# Patient Record
Sex: FEMALE | Race: WHITE | NOT HISPANIC OR LATINO | Employment: OTHER | URBAN - NONMETROPOLITAN AREA
[De-identification: names, ages, dates, MRNs, and addresses within clinical notes are randomized per-mention and may not be internally consistent; named-entity substitution may affect disease eponyms.]

---

## 2024-09-06 ENCOUNTER — MEDICAL CORRESPONDENCE (OUTPATIENT)
Dept: PET IMAGING | Facility: HOSPITAL | Age: 79
End: 2024-09-06

## 2024-09-25 ENCOUNTER — HOSPITAL ENCOUNTER (OUTPATIENT)
Dept: PET IMAGING | Facility: HOSPITAL | Age: 79
Discharge: HOME | End: 2024-09-25
Attending: INTERNAL MEDICINE | Admitting: INTERNAL MEDICINE
Payer: MEDICARE

## 2024-09-25 DIAGNOSIS — Z87.891 PERSONAL HISTORY OF SMOKING: ICD-10-CM

## 2024-09-25 DIAGNOSIS — R91.8 PULMONARY NODULES: ICD-10-CM

## 2024-09-25 PROCEDURE — 78815 PET IMAGE W/CT SKULL-THIGH: CPT | Mod: PI

## 2024-09-25 PROCEDURE — 343N000001 HC RX 343: Performed by: RADIOLOGY

## 2024-09-25 PROCEDURE — A9552 F18 FDG: HCPCS | Performed by: RADIOLOGY

## 2024-09-25 RX ORDER — FLUDEOXYGLUCOSE F 18 200 MCI/ML
12.1 INJECTION, SOLUTION INTRAVENOUS ONCE
Status: COMPLETED | OUTPATIENT
Start: 2024-09-25 | End: 2024-09-25

## 2024-09-25 RX ADMIN — FLUDEOXYGLUCOSE F 18 12.1 MILLICURIE: 200 INJECTION, SOLUTION INTRAVENOUS at 10:56

## 2024-12-20 PROBLEM — I21.A1 TYPE 2 ACUTE MYOCARDIAL INFARCTION (H): Status: ACTIVE | Noted: 2019-12-15

## 2024-12-20 PROBLEM — J44.1 CHRONIC OBSTRUCTIVE PULMONARY DISEASE WITH (ACUTE) EXACERBATION (H): Status: ACTIVE | Noted: 2018-03-28

## 2024-12-20 PROBLEM — M12.811 ROTATOR CUFF ARTHROPATHY, RIGHT: Status: ACTIVE | Noted: 2022-02-03

## 2024-12-20 PROBLEM — R15.9 INCONTINENCE OF BOWEL: Status: ACTIVE | Noted: 2018-04-05

## 2024-12-20 PROBLEM — K20.90 ESOPHAGITIS DETERMINED BY ENDOSCOPY: Status: ACTIVE | Noted: 2019-12-29

## 2024-12-20 PROBLEM — L27.0 ALLERGIC DRUG RASH: Status: ACTIVE | Noted: 2023-04-05

## 2024-12-20 PROBLEM — K80.20 CALCULUS OF GALLBLADDER WITHOUT CHOLECYSTITIS WITHOUT OBSTRUCTION: Status: ACTIVE | Noted: 2022-09-19

## 2024-12-20 PROBLEM — I97.3 POSTOPERATIVE HYPERTENSION: Status: ACTIVE | Noted: 2022-04-06

## 2024-12-20 PROBLEM — R06.02 SHORTNESS OF BREATH: Status: ACTIVE | Noted: 2018-03-26

## 2024-12-20 PROBLEM — D64.9 ANEMIA: Status: ACTIVE | Noted: 2021-12-13

## 2024-12-20 PROBLEM — Z96.1 S/P LENS IMPLANT: Status: ACTIVE | Noted: 2023-08-24

## 2024-12-20 PROBLEM — G90.3 NEUROGENIC ORTHOSTATIC HYPOTENSION (H): Status: ACTIVE | Noted: 2018-03-21

## 2024-12-20 PROBLEM — H54.61 DECREASED VISION OF RIGHT EYE: Status: ACTIVE | Noted: 2022-10-22

## 2024-12-20 PROBLEM — E16.2 HYPOGLYCEMIA WITHOUT DIAGNOSIS OF DIABETES MELLITUS: Status: ACTIVE | Noted: 2023-04-11

## 2024-12-20 PROBLEM — E87.1 CHRONIC HYPONATREMIA: Status: ACTIVE | Noted: 2022-04-06

## 2024-12-20 PROBLEM — M62.82 NON-TRAUMATIC RHABDOMYOLYSIS: Status: ACTIVE | Noted: 2020-09-16

## 2024-12-20 PROBLEM — H35.032 HYPERTENSIVE RETINOPATHY OF LEFT EYE: Status: ACTIVE | Noted: 2023-04-11

## 2024-12-20 PROBLEM — I69.30 HISTORY OF CVA WITH RESIDUAL DEFICIT: Status: ACTIVE | Noted: 2023-08-24

## 2024-12-20 PROBLEM — F10.90 HABITUAL ALCOHOL USE: Status: ACTIVE | Noted: 2020-09-16

## 2024-12-20 PROBLEM — H43.821 VITREOMACULAR TRACTION, RIGHT: Status: ACTIVE | Noted: 2022-10-05

## 2024-12-20 PROBLEM — M41.56 SCOLIOSIS OF LUMBAR REGION DUE TO DEGENERATIVE DISEASE OF SPINE IN ADULT: Status: ACTIVE | Noted: 2019-07-24

## 2024-12-20 PROBLEM — E55.9 VITAMIN D DEFICIENCY: Status: ACTIVE | Noted: 2022-09-16

## 2024-12-20 PROBLEM — E78.5 HLD (HYPERLIPIDEMIA): Status: ACTIVE | Noted: 2018-01-30

## 2024-12-20 PROBLEM — R20.2 NUMBNESS AND TINGLING OF LEFT UPPER AND LOWER EXTREMITY: Status: ACTIVE | Noted: 2022-05-19

## 2024-12-20 PROBLEM — H34.8120 CENTRAL RETINAL VEIN OCCLUSION WITH MACULAR EDEMA OF LEFT EYE (H): Status: ACTIVE | Noted: 2023-08-29

## 2024-12-20 PROBLEM — R19.7 DIARRHEA, UNSPECIFIED TYPE: Status: ACTIVE | Noted: 2018-08-10

## 2024-12-20 PROBLEM — M19.011 GLENOHUMERAL ARTHRITIS, RIGHT: Status: ACTIVE | Noted: 2019-08-21

## 2024-12-20 PROBLEM — M81.0 SENILE OSTEOPOROSIS: Status: ACTIVE | Noted: 2018-06-20

## 2024-12-20 PROBLEM — H25.813 COMBINED FORMS OF AGE-RELATED CATARACT, BILATERAL: Status: ACTIVE | Noted: 2023-02-09

## 2024-12-20 PROBLEM — Z85.3 PERSONAL HISTORY OF MALIGNANT NEOPLASM OF BREAST: Status: ACTIVE | Noted: 2017-11-01

## 2024-12-20 PROBLEM — H34.8310 BRANCH RETINAL VEIN OCCLUSION OF RIGHT EYE WITH MACULAR EDEMA (H): Status: ACTIVE | Noted: 2022-10-05

## 2024-12-20 PROBLEM — E44.0 MODERATE PROTEIN MALNUTRITION (H): Status: ACTIVE | Noted: 2019-12-20

## 2024-12-20 PROBLEM — R06.00 ACUTE DYSPNEA: Status: ACTIVE | Noted: 2022-09-14

## 2024-12-20 PROBLEM — H34.8312 STABLE BRANCH RETINAL VEIN OCCLUSION OF RIGHT EYE (H): Status: ACTIVE | Noted: 2022-10-05

## 2024-12-20 PROBLEM — E61.1 IRON DEFICIENCY: Status: ACTIVE | Noted: 2022-09-16

## 2024-12-20 PROBLEM — I50.32 CHRONIC DIASTOLIC HEART FAILURE (H): Status: ACTIVE | Noted: 2018-06-15

## 2024-12-20 PROBLEM — J43.9 PULMONARY EMPHYSEMA, UNSPECIFIED EMPHYSEMA TYPE (H): Status: ACTIVE | Noted: 2018-05-11

## 2024-12-20 PROBLEM — K92.2 UPPER GI BLEED: Status: ACTIVE | Noted: 2019-12-28

## 2024-12-20 PROBLEM — Z74.09 IMPAIRED FUNCTIONAL MOBILITY, BALANCE, GAIT, AND ENDURANCE: Status: ACTIVE | Noted: 2022-05-17

## 2024-12-20 PROBLEM — I16.0 HYPERTENSIVE URGENCY: Status: ACTIVE | Noted: 2022-11-14

## 2024-12-20 PROBLEM — L93.1 SUBACUTE CUTANEOUS LUPUS ERYTHEMATOSUS: Status: ACTIVE | Noted: 2018-02-27

## 2024-12-20 PROBLEM — R41.89 COGNITIVE IMPAIRMENT: Status: ACTIVE | Noted: 2022-10-26

## 2024-12-20 PROBLEM — K59.09 CHRONIC CONSTIPATION WITH OVERFLOW: Status: ACTIVE | Noted: 2018-11-14

## 2024-12-20 PROBLEM — R74.8 ELEVATED LIVER ENZYMES: Status: ACTIVE | Noted: 2022-09-19

## 2024-12-20 PROBLEM — G57.22 FEMORAL NEUROPATHY OF LEFT LOWER EXTREMITY: Status: ACTIVE | Noted: 2018-04-07

## 2024-12-20 PROBLEM — H53.9 VISUAL DISTURBANCE: Status: ACTIVE | Noted: 2022-09-14

## 2024-12-20 PROBLEM — E53.8 B12 DEFICIENCY: Status: ACTIVE | Noted: 2022-09-16

## 2024-12-20 PROBLEM — R20.0 NUMBNESS AND TINGLING OF LEFT UPPER AND LOWER EXTREMITY: Status: ACTIVE | Noted: 2022-05-19

## 2024-12-20 PROBLEM — R20.2 PARESTHESIA OF BOTH HANDS: Status: ACTIVE | Noted: 2022-09-14

## 2024-12-20 PROBLEM — R21 RASH OF BOTH FEET: Status: ACTIVE | Noted: 2023-12-12

## 2024-12-20 PROBLEM — R07.89 OTHER CHEST PAIN: Status: ACTIVE | Noted: 2022-11-14

## 2024-12-20 PROBLEM — R91.1 LUNG NODULE: Status: ACTIVE | Noted: 2024-10-03

## 2024-12-20 PROBLEM — R91.1 SOLITARY PULMONARY NODULE: Status: ACTIVE | Noted: 2022-07-19

## 2024-12-20 PROBLEM — I63.81 RIGHT-SIDED LACUNAR INFARCTION (H): Status: ACTIVE | Noted: 2022-09-15
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
## 2024-12-20 RX ORDER — AMLODIPINE BESYLATE 5 MG/1
1 TABLET ORAL DAILY
COMMUNITY
Start: 2024-12-19

## 2024-12-20 RX ORDER — MAGNESIUM OXIDE 400 MG/1
1 TABLET ORAL DAILY
COMMUNITY
Start: 2023-04-21

## 2024-12-20 RX ORDER — ASCORBIC ACID 500 MG
1 TABLET ORAL DAILY
COMMUNITY
Start: 2024-01-04

## 2024-12-20 RX ORDER — FAMOTIDINE 20 MG/1
1 TABLET, FILM COATED ORAL 2 TIMES DAILY
COMMUNITY
Start: 2024-12-19

## 2024-12-20 RX ORDER — ATORVASTATIN CALCIUM 40 MG/1
TABLET, FILM COATED ORAL
COMMUNITY
Start: 2024-12-19

## 2024-12-20 RX ORDER — ACETAMINOPHEN 325 MG/1
TABLET ORAL
COMMUNITY
Start: 2023-07-26

## 2024-12-20 RX ORDER — FERROUS SULFATE 325(65) MG
TABLET ORAL
COMMUNITY
Start: 2024-05-03

## 2024-12-20 RX ORDER — SUCRALFATE 1 G/1
TABLET ORAL
COMMUNITY
Start: 2024-12-19 | End: 2024-12-23

## 2024-12-20 RX ORDER — CALCIUM CARBONATE 500 MG/1
1 TABLET, CHEWABLE ORAL PRN
COMMUNITY

## 2024-12-20 RX ORDER — CARVEDILOL 25 MG/1
1 TABLET ORAL 2 TIMES DAILY WITH MEALS
COMMUNITY
Start: 2024-12-19

## 2024-12-20 NOTE — PROGRESS NOTES
"Radiation Oncology Rooming Note    December 20, 2024 4:04 PM     Karen Rodriguez is a 79 year old female who presents for:       Chief Complaint   Patient presents with    Consult     Radiation Oncology Consultation        Initial Vitals: /50 (BP Location: Right arm, Patient Position: Chair, Cuff Size: Adult Regular)   Pulse 68   Temp 97  F (36.1  C) (Tympanic)   Resp 16   Ht 1.499 m (4' 11\")   Wt 45.1 kg (99 lb 6.4 oz)   SpO2 100%   BMI 20.08 kg/m     Estimated body mass index is 20.08 kg/m  as calculated from the following:    Height as of this encounter: 1.499 m (4' 11\").    Weight as of this encounter: 45.1 kg (99 lb 6.4 oz).   Body surface area is 1.37 meters squared.  No Pain (0) Comment: Data Unavailable       Allergies reviewed: Yes  Medications reviewed: Yes      Patient completed the following questionnaires: PHQ-9 and NCCN Distress Thermometer.       Patient was assessed using the NCCN psychosocial distress thermometer. Patient rated the score as a 0. Patient rated current stressors as NA. Stressors brought to the attention of provider for a score of 6 or greater or per nurses discretion.       Patient received folder containing the following:  radiation planning process packet  radiation therapy timeline  financial letter  vaccines during cancer treatment hand out  mental health services and providers packet   business card  radiation therapy business card      Financial assistance resources given to patient:  [employer] application       Patient given site specific instructions including:   SBRT handout    Spoke to SANTO Suarez, and patient does have rides through her insurance.  Once future appointments are scheduled, Daniela can assist with scheduling [employer].  She also informed patient.        Ada Stokes RN  "

## 2024-12-23 ENCOUNTER — ONCOLOGY VISIT (OUTPATIENT)
Dept: RADIATION ONCOLOGY | Facility: HOSPITAL | Age: 79
End: 2024-12-23
Attending: RADIOLOGY
Payer: MEDICARE

## 2024-12-23 ENCOUNTER — ONCOLOGY VISIT (OUTPATIENT)
Dept: RADIATION ONCOLOGY | Facility: HOSPITAL | Age: 79
End: 2024-12-23
Payer: COMMERCIAL

## 2024-12-23 ENCOUNTER — TELEPHONE (OUTPATIENT)
Dept: INTERVENTIONAL RADIOLOGY/VASCULAR | Facility: HOSPITAL | Age: 79
End: 2024-12-23

## 2024-12-23 ENCOUNTER — TELEPHONE (OUTPATIENT)
Dept: FAMILY MEDICINE | Facility: OTHER | Age: 79
End: 2024-12-23

## 2024-12-23 VITALS
WEIGHT: 99.4 LBS | OXYGEN SATURATION: 100 % | SYSTOLIC BLOOD PRESSURE: 116 MMHG | RESPIRATION RATE: 16 BRPM | TEMPERATURE: 97 F | DIASTOLIC BLOOD PRESSURE: 50 MMHG | HEIGHT: 59 IN | HEART RATE: 68 BPM | BODY MASS INDEX: 20.04 KG/M2

## 2024-12-23 DIAGNOSIS — C34.91 NSCLC OF RIGHT LUNG (H): Primary | ICD-10-CM

## 2024-12-23 PROCEDURE — G0463 HOSPITAL OUTPT CLINIC VISIT: HCPCS

## 2024-12-23 ASSESSMENT — PAIN SCALES - GENERAL: PAINLEVEL_OUTOF10: NO PAIN (0)

## 2024-12-23 ASSESSMENT — PATIENT HEALTH QUESTIONNAIRE - PHQ9: SUM OF ALL RESPONSES TO PHQ QUESTIONS 1-9: 0

## 2024-12-23 NOTE — TELEPHONE ENCOUNTER
spoke to patient and confirmed she would like assistance with transportation. She stated that appointments are next week.  called and left voicemail for Radiation RN coordinator  requesting confirmed dates/times of the appointments before this writer calls [facility] to schedule transportation.

## 2024-12-23 NOTE — PROVIDER NOTIFICATION
Attempted to call pt to schedule a CT biopsy. Unable to reach pt and message box not set up. Talked with pt's daughter Leon who was with pt at today's radiation appointment. Daughter informed we need a history & Physical within 30 days of scheduling biopsy and to have pt call when she knows H&P date and we will proceed with scheduling. DI Nursing number given to daughter. Daughter stated they are having issues with transportation to and from the appointments.

## 2024-12-23 NOTE — PROGRESS NOTES
Radiation oncology consultation note    Referring physician:  Dr. Micaela Fuentes    Diagnosis: FDG avid right lower lobe pulmonary lesion, status post endobronchial ultrasound and robotic assisted electromagnetic navigation bronchoscopy with fluoroscopy fiducial marker placement on November 18, 2024    Supplemental Oxygen (last filed value): n/a    Performance Status: ECOG 2    Reason for Visit  Consultation for treatment options regarding the role of stereotactic body radiation treatment for her FDG avid right lower lobe pulmonary lesion.    History  Ms. Karen Rodriguez was seen today for a radiation oncology consultation at the request of Dr. Micaela Fuentes.  She is an 79-year-old lady with an underlying diagnosis of stage IA, [id number], cM0 ER/HI positive, HER2 negative, grade 1 invasive lobular carcinoma of the left breast, status post left lumpectomy and sentinel node biopsy on January 3, 2018, and COPD, rheumatoid arthritis, history of SLE.  She was on adjuvant endocrine treatment with tamoxifen but did not receive postoperative left breast radiation treatment.    Her CT chest angiogram on July 16, 2023 was remarkable for increasing 7 mm right lower lobe noncalcified pulmonary nodule, compared to the study on November 12, 2023.  Follow-up CT chest without contrast on July 18, 2024 showed 9 mm anterolateral right lower lobe subpleural irregular soft tissue nodule.    He saw Dr. Oni Servin on August 29, 2024 and FDG PET/CT study on September 25, 2024 was remarkable for 1.1 x 0.7 cm FDG avid pleural-based bilobed right lower lobe pulmonary nodule, mild to moderate emphysematous changes without abnormal hypermetabolic mediastinal, hilar, or axillary nodes.  The right lower lobe lesion was not amenable to navigational bronchoscopy or CT-guided biopsy.  Treatment options of wedge resection versus SBRT were discussed with the patient by Dr. Servin, and she decided to proceed with radiation treatment without pathologic  diagnosis.    CT chest without contrast on November 13, 2024 showed 10 mm nodule in the right lower lobe adjacent to the fissure and stable small, less than 3 mm, pulmonary nodules.  She underwent endobronchial ultrasound and robotic assisted electromagnetic navigation bronchoscopy with fluoroscopy fiducial marker placement on November 18, 2024.  By an operative note, station 4R, 4L, 7, 10R, 10L, 11R, and 11L nodes were not enlarged.  Biopsy was not performed.  4 fiducial markers were placed surrounding the lesion.    She saw Dr. Micaela Fuentes for return oncology consultation and the role of SBRT was discussed and recommended.  She preferred to have her radiation treatment closer to home.  She denies any history of increased shortness of breath, oxygen dependent, chest pain or palpitation, fever/chills, increased cough, hemoptysis.  She is not on nebulizer or inhaler.  As far as she can recall, she has not had any treatment for rheumatoid arthritis or SLE.  She stopped smoking in 2018 and had approximately 59-pack-year history of tobacco smoking.  According to the patient, her follow-up bilateral mammogram last year was unremarkable.  She does not have previous history of radiation treatment or pacemaker placement.    On examination, she is alert, cooperative, and in no acute distress. Psychiatric: mood and affect are appropriate. Neck: Supple without mass, adenopathy, or thyromegaly. Trachea is in midline. Lymphatics: There is no palpable cervical, supraclavicular, axillary adenopathy.  Right breast examination is unremarkable for palpable breast mass lesion or nipple discharge.  Left breast examination reveals well-healed surgical scar without palpable abnormal breast mass lesion or nipple discharge.  Cardiac: Regular rate and rhythm. S1, S2. Lungs: Decreased breath sounds without rales or rhonchi. Lung fields are resonant to percussion. Abdomen: Soft, nontender, and without palpable mass lesion. Bowel sounds  are active. Extremities: Without clubbing, cyanosis, swelling, or edematous changes. Musculoskeletal: Unremarkable for bony tenderness on palpation. There is no limitation in range of motion.     Data  I personally reviewed the patient's record records, procedure/operative notes, pathology reports, laboratory work and radiology studies as detailed above including the discussion of the results with the patient. This is very useful for radiation treatment evaluation and recommendations.    Assessment  The patient is an 79-year-old lady with a diagnosis of FDG avid right lower lobe pulmonary lesion, suspicious for malignancy.    I reviewed her imaging studies with Dr. Mejia, our interventional radiologist, and he believed that she was a candidate for CT-guided biopsy of the right peripheral lower lobe pulmonary lesion to obtain pathologic diagnosis.  To this end, I discussed the role of CT-guided biopsy of right lower lobe pulmonary lesion with the patient and her daughter, and she agreed to proceed with CT-guided biopsy at this time.    I discussed the role of stereotactic body radiation treatment for the right lower lobe FDG avid pulmonary lesion suspicious for malignancy, pending the result of pathology from CT-guided biopsy, the goal of stereotactic body radiation treatment with curative intent for primary right lower lobe lung cancer versus for metastatic breast cancer involving right lung, technical details of stereotactic body radiation treatment with image guided volumetric modulated arc therapy, time course of stereotactic body radiation treatment over 3 fractions every other day, anticipated outcomes following radiation treatment, alternatives of surgery, no radiation treatment, comfort care,  as well as potential short-term and long-term radiation related adverse reaction which includes but is not limited to general fatigue, skin irritation, redness, dryness, tanning, peeling, cough, shortness of  breath, painful/difficulty swallowing, weight loss necessitating placement of PEG tube feeding for nutritional support and hydration, hoarseness of the voice, chest pain, symptoms from nerve damages and heart damages, skin discoloration, thickening, fibrosis, rib fracture, pneumonitis (difficulty breathing), requiring oxygen supplement and becoming supplemental oxygen dependent and wheelchair bound, pulmonary fibrosis, pericarditis, myocarditis, heart damages, esophageal stricture, fistula formation leading to life threatening mediastinitis (infection of mediastinum/chest), spinal cord damage, nerve damage, and damage to other normal tissues, and secondary malignancies.    At the end of our consultation, the patient felt that she understood the above discussion well, and that all questions were addressed thoroughly and to her satisfaction. The patient indicated that she planned to proceed with CT-guided biopsy of the right lower lobe pulmonary radiation, followed by stereotactic body radiation treatment, pending the pathology result.    Plan  The timeline, logistics, personnel, imaging, risks, and benefits associated with radiation therapy were explained.  All questions were answered, and the patient would like to proceed with the above plan.    CT-guided biopsy and pulmonary function test have been scheduled for her.  I will await the results at this time.    She will be scheduled for CT based radiation planning, followed by stereotactic body radiation treatment as outlined, pending the result of CT-guided biopsy.     Thank you very much for the opportunity to assist in the care of your patient. If I can be of further assistance, please do not hesitate to contact me.    Total exam time spent on the day of the visit including review of the chart/medical records, procedure/operative notes, pathology reports, laboratory work, reviewing and interpreting pertinent imaging studies and notes, obtaining a detailed history  and physical exam, education, discussion/counseling regarding the above diagnosis and the radiation oncology treatment plan with the patient and documenting in epic was 90 minutes.    This chart is completed using [employer] voice recognition. Because of the limitations of this technology, there may be some error in the transcription that are unintended despite editing on my part. If you have any questions or concerns, please do not hesitate to contact me for clarification.

## 2024-12-26 ENCOUNTER — TELEPHONE (OUTPATIENT)
Dept: FAMILY MEDICINE | Facility: OTHER | Age: 79
End: 2024-12-26

## 2024-12-26 ENCOUNTER — TELEPHONE (OUTPATIENT)
Dept: INTERVENTIONAL RADIOLOGY/VASCULAR | Facility: HOSPITAL | Age: 79
End: 2024-12-26

## 2024-12-26 NOTE — PROVIDER NOTIFICATION
Spoke with pt's daughter Leon. Leon  stated that she made pre-op H&P for 12/30/2024. Leon agreed to 1/14/2024 arrival 0745 for pt's CT lung biopsy. Went over NPO after midnight, pt must have a  (family of friend) that can drive her and stay with her after the procedure. Leon stated she will probably be the one to drive/stay with pt after procedure. Attempted to call pt. Pt's voice mail is not set up so unable to leave a message.

## 2024-12-26 NOTE — TELEPHONE ENCOUNTER
spoke to [facility] staff member through the [employer] ([phone]) regarding free transportation to related medical appointments. [employer] Staff member confirmed that this service is available in city where patient lives. This writer called and discussed this with patient. This writer provided her with this information and also the contact information ([phone]) to schedule the ride once appointment is known. This writer also mailed her information on the [employer] [facility] with contact information for transportation.

## 2024-12-31 ENCOUNTER — HOSPITAL ENCOUNTER (OUTPATIENT)
Dept: RESPIRATORY THERAPY | Facility: HOSPITAL | Age: 79
Discharge: HOME | End: 2024-12-31
Attending: RADIOLOGY
Payer: MEDICARE

## 2024-12-31 DIAGNOSIS — L27.0 ALLERGIC DRUG RASH: ICD-10-CM

## 2024-12-31 DIAGNOSIS — R06.00 ACUTE DYSPNEA: Primary | ICD-10-CM

## 2024-12-31 DIAGNOSIS — R06.02 SHORTNESS OF BREATH: ICD-10-CM

## 2024-12-31 DIAGNOSIS — C34.91 NSCLC OF RIGHT LUNG (H): ICD-10-CM

## 2024-12-31 DIAGNOSIS — R91.1 LUNG NODULE: ICD-10-CM

## 2024-12-31 LAB
DLCOCOR-%PRED-PRE: 7 %
DLCOCOR-PRE: 1.28 ML/MIN/MMHG
DLCOUNC-%PRED-PRE: 7 %
DLCOUNC-PRE: 1.16 ML/MIN/MMHG
DLCOUNC-PRED: 16.03 ML/MIN/MMHG
ERV-PRE: 0.57 L
EXPTIME-PRE: 8.87 SEC
FEF2575-%PRED-POST: 28 %
FEF2575-%PRED-PRE: 22 %
FEF2575-POST: 0.38 L/SEC
FEF2575-PRE: 0.3 L/SEC
FEF2575-PRED: 1.31 L/SEC
FEFMAX-%PRED-PRE: 59 %
FEFMAX-PRE: 2.37 L/SEC
FEFMAX-PRED: 4.02 L/SEC
FEV1-%PRED-PRE: 48 %
FEV1-PRE: 0.74 L
FEV1FEV6-PRE: 49 %
FEV1FEV6-PRED: 78 %
FEV1FVC-PRE: 47 %
FEV1FVC-PRED: 78 %
FEV1SVC-PRE: 42 %
FIFMAX-PRE: 1.31 L/SEC
FRCPLETH-%PRED-PRE: 203 %
FRCPLETH-PRE: 5.01 L
FRCPLETH-PRED: 2.46 L
FVC-%PRED-PRE: 81 %
FVC-PRE: 1.57 L
FVC-PRED: 1.94 L
GAW-%PRED-PRE: 30 %
GAW-PRE: 0.32 L/S/CMH2O
GAW-PRED: 1.03 L/S/CMH2O
HGB BLD-MCNC: 10.8 G/DL (ref 11.7–15.7)
IC-PRE: 1.19 L
RVPLETH-%PRED-PRE: 222 %
RVPLETH-PRE: 4.44 L
RVPLETH-PRED: 2 L
SGAW-%PRED-PRE: 67 %
SGAW-PRE: 0.07 1/CMH2O*S
SGAW-PRED: 0.1 1/CMH2O*S
SRAW-%PRED-PRE: 304 %
SRAW-PRE: 14.48 CMH2O*S
SRAW-PRED: 4.76 CMH2O*S
TLCPLETH-%PRED-PRE: 151 %
TLCPLETH-PRE: 6.2 L
TLCPLETH-PRED: 4.1 L
VA-%PRED-PRE: 15 %
VA-PRE: 0.59 L
VC-PRE: 1.76 L

## 2024-12-31 PROCEDURE — 36415 COLL VENOUS BLD VENIPUNCTURE: CPT | Performed by: RADIOLOGY

## 2024-12-31 PROCEDURE — 85018 HEMOGLOBIN: CPT | Performed by: RADIOLOGY

## 2024-12-31 PROCEDURE — 250N000009 HC RX 250: Performed by: INTERNAL MEDICINE

## 2024-12-31 PROCEDURE — 94726 PLETHYSMOGRAPHY LUNG VOLUMES: CPT

## 2024-12-31 PROCEDURE — 94729 DIFFUSING CAPACITY: CPT

## 2024-12-31 PROCEDURE — 94060 EVALUATION OF WHEEZING: CPT

## 2024-12-31 RX ORDER — ALBUTEROL SULFATE 0.83 MG/ML
2.5 SOLUTION RESPIRATORY (INHALATION) EVERY 6 HOURS PRN
Status: DISCONTINUED | OUTPATIENT
Start: 2024-12-31 | End: 2025-01-01 | Stop reason: HOSPADM

## 2024-12-31 RX ADMIN — ALBUTEROL SULFATE 2.5 MG: 2.5 SOLUTION RESPIRATORY (INHALATION) at 11:56

## 2025-01-09 ENCOUNTER — DOCUMENTATION ONLY (OUTPATIENT)
Dept: OTHER | Facility: CLINIC | Age: 80
End: 2025-01-09

## 2025-01-17 RX ORDER — FENTANYL CITRATE 50 UG/ML
50-100 INJECTION, SOLUTION INTRAMUSCULAR; INTRAVENOUS ONCE
Status: CANCELLED | OUTPATIENT
Start: 2025-01-20 | End: 2025-01-20

## 2025-01-20 ENCOUNTER — HOSPITAL ENCOUNTER (OUTPATIENT)
Dept: CT IMAGING | Facility: HOSPITAL | Age: 80
Discharge: HOME | End: 2025-01-20
Attending: RADIOLOGY
Payer: MEDICARE

## 2025-01-20 ENCOUNTER — APPOINTMENT (OUTPATIENT)
Dept: GENERAL RADIOLOGY | Facility: HOSPITAL | Age: 80
End: 2025-01-20
Attending: STUDENT IN AN ORGANIZED HEALTH CARE EDUCATION/TRAINING PROGRAM
Payer: MEDICARE

## 2025-01-20 ENCOUNTER — HOSPITAL ENCOUNTER (OUTPATIENT)
Facility: HOSPITAL | Age: 80
Discharge: HOME | End: 2025-01-20
Attending: RADIOLOGY | Admitting: RADIOLOGY
Payer: MEDICARE

## 2025-01-20 ENCOUNTER — HOSPITAL ENCOUNTER (EMERGENCY)
Facility: HOSPITAL | Age: 80
Discharge: OTHER INSTITUTION | End: 2025-01-20
Attending: STUDENT IN AN ORGANIZED HEALTH CARE EDUCATION/TRAINING PROGRAM
Payer: MEDICARE

## 2025-01-20 ENCOUNTER — HOSPITAL ENCOUNTER (INPATIENT)
Facility: OTHER | Age: 80
End: 2025-01-20
Attending: STUDENT IN AN ORGANIZED HEALTH CARE EDUCATION/TRAINING PROGRAM | Admitting: STUDENT IN AN ORGANIZED HEALTH CARE EDUCATION/TRAINING PROGRAM
Payer: MEDICARE

## 2025-01-20 VITALS
HEART RATE: 56 BPM | DIASTOLIC BLOOD PRESSURE: 50 MMHG | SYSTOLIC BLOOD PRESSURE: 102 MMHG | OXYGEN SATURATION: 100 % | RESPIRATION RATE: 18 BRPM

## 2025-01-20 VITALS
DIASTOLIC BLOOD PRESSURE: 59 MMHG | OXYGEN SATURATION: 94 % | HEART RATE: 73 BPM | SYSTOLIC BLOOD PRESSURE: 119 MMHG | TEMPERATURE: 96.3 F | RESPIRATION RATE: 20 BRPM

## 2025-01-20 DIAGNOSIS — I95.1 ORTHOSTATIC HYPOTENSION: ICD-10-CM

## 2025-01-20 DIAGNOSIS — F10.90 HABITUAL ALCOHOL USE: ICD-10-CM
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
## 2025-01-20 DIAGNOSIS — R20.2 PARESTHESIA OF BOTH HANDS: ICD-10-CM

## 2025-01-20 DIAGNOSIS — E44.0 MODERATE PROTEIN MALNUTRITION: ICD-10-CM

## 2025-01-20 DIAGNOSIS — M41.56 SCOLIOSIS OF LUMBAR REGION DUE TO DEGENERATIVE DISEASE OF SPINE IN ADULT: ICD-10-CM

## 2025-01-20 DIAGNOSIS — I50.32 CHRONIC DIASTOLIC HEART FAILURE (H): ICD-10-CM

## 2025-01-20 DIAGNOSIS — K59.09 CHRONIC CONSTIPATION WITH OVERFLOW: ICD-10-CM

## 2025-01-20 DIAGNOSIS — H35.371 EPIRETINAL MEMBRANE (ERM) OF RIGHT EYE: ICD-10-CM

## 2025-01-20 DIAGNOSIS — E16.2 HYPOGLYCEMIA WITHOUT DIAGNOSIS OF DIABETES MELLITUS: ICD-10-CM

## 2025-01-20 DIAGNOSIS — D62 ACUTE BLOOD LOSS ANEMIA: ICD-10-CM

## 2025-01-20 DIAGNOSIS — M62.82 NON-TRAUMATIC RHABDOMYOLYSIS: ICD-10-CM

## 2025-01-20 DIAGNOSIS — R74.8 ELEVATED LIVER ENZYMES: ICD-10-CM

## 2025-01-20 DIAGNOSIS — R07.89 OTHER CHEST PAIN: ICD-10-CM

## 2025-01-20 DIAGNOSIS — H54.61 DECREASED VISION OF RIGHT EYE: ICD-10-CM

## 2025-01-20 DIAGNOSIS — L27.0 ALLERGIC DRUG RASH: ICD-10-CM

## 2025-01-20 DIAGNOSIS — H34.8310 BRANCH RETINAL VEIN OCCLUSION OF RIGHT EYE WITH MACULAR EDEMA (H): ICD-10-CM

## 2025-01-20 DIAGNOSIS — J44.1 CHRONIC OBSTRUCTIVE PULMONARY DISEASE WITH (ACUTE) EXACERBATION (H): ICD-10-CM

## 2025-01-20 DIAGNOSIS — G90.3 NEUROGENIC ORTHOSTATIC HYPOTENSION (H): ICD-10-CM

## 2025-01-20 DIAGNOSIS — J95.811 POSTPROCEDURAL PNEUMOTHORAX: ICD-10-CM

## 2025-01-20 DIAGNOSIS — D50.0 ANEMIA DUE TO BLOOD LOSS: Chronic | ICD-10-CM

## 2025-01-20 DIAGNOSIS — E87.1 CHRONIC HYPONATREMIA: ICD-10-CM

## 2025-01-20 DIAGNOSIS — I10 ESSENTIAL (PRIMARY) HYPERTENSION: ICD-10-CM

## 2025-01-20 DIAGNOSIS — H25.13 AGE-RELATED NUCLEAR CATARACT, BILATERAL: ICD-10-CM

## 2025-01-20 DIAGNOSIS — H53.9 VISUAL DISTURBANCE: ICD-10-CM
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
## 2025-01-20 DIAGNOSIS — K92.2 UPPER GI BLEED: ICD-10-CM

## 2025-01-20 DIAGNOSIS — R20.0 NUMBNESS AND TINGLING OF LEFT UPPER AND LOWER EXTREMITY: ICD-10-CM

## 2025-01-20 DIAGNOSIS — E61.1 IRON DEFICIENCY: ICD-10-CM

## 2025-01-20 DIAGNOSIS — R20.2 NUMBNESS AND TINGLING OF LEFT UPPER AND LOWER EXTREMITY: ICD-10-CM

## 2025-01-20 DIAGNOSIS — I63.81 RIGHT-SIDED LACUNAR INFARCTION (H): ICD-10-CM

## 2025-01-20 DIAGNOSIS — R91.1 LUNG NODULE: ICD-10-CM

## 2025-01-20 DIAGNOSIS — K21.9 GASTROESOPHAGEAL REFLUX DISEASE WITHOUT ESOPHAGITIS: ICD-10-CM

## 2025-01-20 DIAGNOSIS — J43.9 PULMONARY EMPHYSEMA, UNSPECIFIED EMPHYSEMA TYPE (H): ICD-10-CM

## 2025-01-20 DIAGNOSIS — M81.0 SENILE OSTEOPOROSIS: ICD-10-CM

## 2025-01-20 DIAGNOSIS — K80.20 CALCULUS OF GALLBLADDER WITHOUT CHOLECYSTITIS WITHOUT OBSTRUCTION: ICD-10-CM

## 2025-01-20 DIAGNOSIS — I16.0 HYPERTENSIVE URGENCY: ICD-10-CM

## 2025-01-20 DIAGNOSIS — Z85.3 PERSONAL HISTORY OF MALIGNANT NEOPLASM OF BREAST: ICD-10-CM

## 2025-01-20 DIAGNOSIS — K20.90 ESOPHAGITIS DETERMINED BY ENDOSCOPY: ICD-10-CM

## 2025-01-20 DIAGNOSIS — H34.8120 CENTRAL RETINAL VEIN OCCLUSION WITH MACULAR EDEMA OF LEFT EYE (H): ICD-10-CM

## 2025-01-20 DIAGNOSIS — H34.8312 STABLE BRANCH RETINAL VEIN OCCLUSION OF RIGHT EYE (H): ICD-10-CM

## 2025-01-20 DIAGNOSIS — Z96.1: ICD-10-CM

## 2025-01-20 DIAGNOSIS — I69.30 HISTORY OF CVA WITH RESIDUAL DEFICIT: ICD-10-CM

## 2025-01-20 DIAGNOSIS — J93.9 PNEUMOTHORAX ON RIGHT: ICD-10-CM

## 2025-01-20 DIAGNOSIS — R21 RASH OF BOTH FEET: ICD-10-CM

## 2025-01-20 DIAGNOSIS — R06.00 ACUTE DYSPNEA: ICD-10-CM

## 2025-01-20 DIAGNOSIS — R91.1 SOLITARY PULMONARY NODULE: ICD-10-CM

## 2025-01-20 DIAGNOSIS — H43.821 VITREOMACULAR TRACTION, RIGHT: ICD-10-CM

## 2025-01-20 DIAGNOSIS — R41.89 COGNITIVE IMPAIRMENT: ICD-10-CM

## 2025-01-20 DIAGNOSIS — Z74.09 IMPAIRED FUNCTIONAL MOBILITY, BALANCE, GAIT, AND ENDURANCE: ICD-10-CM

## 2025-01-20 DIAGNOSIS — M19.011 GLENOHUMERAL ARTHRITIS, RIGHT: ICD-10-CM

## 2025-01-20 DIAGNOSIS — C34.91 NSCLC OF RIGHT LUNG (H): ICD-10-CM

## 2025-01-20 DIAGNOSIS — H35.032 HYPERTENSIVE RETINOPATHY OF LEFT EYE: ICD-10-CM

## 2025-01-20 DIAGNOSIS — L93.1 SUBACUTE CUTANEOUS LUPUS ERYTHEMATOSUS: ICD-10-CM

## 2025-01-20 DIAGNOSIS — G57.22 FEMORAL NEUROPATHY OF LEFT LOWER EXTREMITY: ICD-10-CM

## 2025-01-20 DIAGNOSIS — E83.52 HYPERCALCEMIA: ICD-10-CM

## 2025-01-20 DIAGNOSIS — R06.02 SHORTNESS OF BREATH: ICD-10-CM

## 2025-01-20 DIAGNOSIS — R15.9 INCONTINENCE OF BOWEL: ICD-10-CM

## 2025-01-20 DIAGNOSIS — R19.7 DIARRHEA, UNSPECIFIED TYPE: ICD-10-CM

## 2025-01-20 DIAGNOSIS — M47.816 SPONDYLOSIS OF LUMBAR REGION WITHOUT MYELOPATHY OR RADICULOPATHY: ICD-10-CM

## 2025-01-20 DIAGNOSIS — H52.7: ICD-10-CM

## 2025-01-20 DIAGNOSIS — M25.512 ACUTE PAIN OF LEFT SHOULDER: ICD-10-CM

## 2025-01-20 DIAGNOSIS — R09.89 RIGHT CAROTID BRUIT: ICD-10-CM

## 2025-01-20 DIAGNOSIS — E78.5 HLD (HYPERLIPIDEMIA): ICD-10-CM

## 2025-01-20 DIAGNOSIS — E55.9 VITAMIN D DEFICIENCY: ICD-10-CM

## 2025-01-20 DIAGNOSIS — D64.9 ANEMIA: ICD-10-CM

## 2025-01-20 DIAGNOSIS — Z78.9 HEALTH CARE DIRECTIVE ON FILE: ICD-10-CM

## 2025-01-20 DIAGNOSIS — C34.91 NSCLC OF RIGHT LUNG (H): Primary | ICD-10-CM

## 2025-01-20 DIAGNOSIS — I97.3 POSTOPERATIVE HYPERTENSION: ICD-10-CM

## 2025-01-20 DIAGNOSIS — M12.811 ROTATOR CUFF ARTHROPATHY, RIGHT: ICD-10-CM

## 2025-01-20 DIAGNOSIS — H25.813 COMBINED FORMS OF AGE-RELATED CATARACT, BILATERAL: ICD-10-CM

## 2025-01-20 DIAGNOSIS — I21.A1 TYPE 2 ACUTE MYOCARDIAL INFARCTION (H): ICD-10-CM

## 2025-01-20 DIAGNOSIS — E53.8 B12 DEFICIENCY: ICD-10-CM

## 2025-01-20 LAB
ALBUMIN SERPL BCG-MCNC: 3.5 G/DL (ref 3.5–5.2)
ALP SERPL-CCNC: 199 U/L (ref 40–150)
ALT SERPL W P-5'-P-CCNC: 26 U/L (ref 0–50)
ANION GAP SERPL CALCULATED.3IONS-SCNC: 4 MMOL/L (ref 7–15)
ANION GAP SERPL CALCULATED.3IONS-SCNC: 6 MMOL/L (ref 7–15)
AST SERPL W P-5'-P-CCNC: 40 U/L (ref 0–45)
BASOPHILS # BLD AUTO: 0 10E3/UL (ref 0–0.2)
BASOPHILS NFR BLD AUTO: 1 %
BILIRUB SERPL-MCNC: 0.4 MG/DL
BUN SERPL-MCNC: 23.1 MG/DL (ref 8–23)
BUN SERPL-MCNC: 23.4 MG/DL (ref 8–23)
CALCIUM SERPL-MCNC: 8.1 MG/DL (ref 8.8–10.4)
CALCIUM SERPL-MCNC: 9.1 MG/DL (ref 8.8–10.4)
CHLORIDE SERPL-SCNC: 106 MMOL/L (ref 98–107)
CHLORIDE SERPL-SCNC: 108 MMOL/L (ref 98–107)
CREAT SERPL-MCNC: 1.1 MG/DL (ref 0.51–0.95)
CREAT SERPL-MCNC: 1.19 MG/DL (ref 0.51–0.95)
EGFRCR SERPLBLD CKD-EPI 2021: 46 ML/MIN/1.73M2
EGFRCR SERPLBLD CKD-EPI 2021: 51 ML/MIN/1.73M2
EOSINOPHIL # BLD AUTO: 0.4 10E3/UL (ref 0–0.7)
EOSINOPHIL NFR BLD AUTO: 9 %
ERYTHROCYTE [DISTWIDTH] IN BLOOD BY AUTOMATED COUNT: 13.2 % (ref 10–15)
ERYTHROCYTE [DISTWIDTH] IN BLOOD BY AUTOMATED COUNT: 13.3 % (ref 10–15)
FLUAV RNA SPEC QL NAA+PROBE: NEGATIVE
FLUBV RNA RESP QL NAA+PROBE: NEGATIVE
GLUCOSE SERPL-MCNC: 90 MG/DL (ref 70–99)
GLUCOSE SERPL-MCNC: 91 MG/DL (ref 70–99)
HCO3 SERPL-SCNC: 24 MMOL/L (ref 22–29)
HCO3 SERPL-SCNC: 25 MMOL/L (ref 22–29)
HCT VFR BLD AUTO: 29.1 % (ref 35–47)
HCT VFR BLD AUTO: 34.1 % (ref 35–47)
HGB BLD-MCNC: 10.8 G/DL (ref 11.7–15.7)
HGB BLD-MCNC: 9.3 G/DL (ref 11.7–15.7)
IMM GRANULOCYTES # BLD: 0 10E3/UL
IMM GRANULOCYTES NFR BLD: 0 %
INR PPP: 1.03 (ref 0.85–1.15)
LYMPHOCYTES # BLD AUTO: 1.4 10E3/UL (ref 0.8–5.3)
LYMPHOCYTES NFR BLD AUTO: 31 %
MCH RBC QN AUTO: 30.5 PG (ref 26.5–33)
MCH RBC QN AUTO: 31 PG (ref 26.5–33)
MCHC RBC AUTO-ENTMCNC: 31.7 G/DL (ref 31.5–36.5)
MCHC RBC AUTO-ENTMCNC: 32 G/DL (ref 31.5–36.5)
MCV RBC AUTO: 96 FL (ref 78–100)
MCV RBC AUTO: 97 FL (ref 78–100)
MONOCYTES # BLD AUTO: 0.6 10E3/UL (ref 0–1.3)
MONOCYTES NFR BLD AUTO: 15 %
NEUTROPHILS # BLD AUTO: 1.9 10E3/UL (ref 1.6–8.3)
NEUTROPHILS NFR BLD AUTO: 45 %
NRBC # BLD AUTO: 0 10E3/UL
NRBC BLD AUTO-RTO: 0 /100
PLAT MORPH BLD: NORMAL
PLATELET # BLD AUTO: 167 10E3/UL (ref 150–450)
PLATELET # BLD AUTO: 217 10E3/UL (ref 150–450)
POTASSIUM SERPL-SCNC: 4.3 MMOL/L (ref 3.4–5.3)
POTASSIUM SERPL-SCNC: 4.4 MMOL/L (ref 3.4–5.3)
PROT SERPL-MCNC: 8.4 G/DL (ref 6.4–8.3)
RBC # BLD AUTO: 3 10E6/UL (ref 3.8–5.2)
RBC # BLD AUTO: 3.54 10E6/UL (ref 3.8–5.2)
RBC MORPH BLD: NORMAL
RSV RNA SPEC NAA+PROBE: NEGATIVE
SARS-COV-2 RNA RESP QL NAA+PROBE: NEGATIVE
SODIUM SERPL-SCNC: 136 MMOL/L (ref 135–145)
SODIUM SERPL-SCNC: 137 MMOL/L (ref 135–145)
WBC # BLD AUTO: 4.4 10E3/UL (ref 4–11)
WBC # BLD AUTO: 4.7 10E3/UL (ref 4–11)

## 2025-01-20 PROCEDURE — 32551 INSERTION OF CHEST TUBE: CPT

## 2025-01-20 PROCEDURE — 99152 MOD SED SAME PHYS/QHP 5/>YRS: CPT | Performed by: STUDENT IN AN ORGANIZED HEALTH CARE EDUCATION/TRAINING PROGRAM

## 2025-01-20 PROCEDURE — 82310 ASSAY OF CALCIUM: CPT | Performed by: STUDENT IN AN ORGANIZED HEALTH CARE EDUCATION/TRAINING PROGRAM

## 2025-01-20 PROCEDURE — 120N000001 HC R&B MED SURG/OB

## 2025-01-20 PROCEDURE — 32551 INSERTION OF CHEST TUBE: CPT | Performed by: STUDENT IN AN ORGANIZED HEALTH CARE EDUCATION/TRAINING PROGRAM

## 2025-01-20 PROCEDURE — 96375 TX/PRO/DX INJ NEW DRUG ADDON: CPT | Mod: XU

## 2025-01-20 PROCEDURE — 250N000013 HC RX MED GY IP 250 OP 250 PS 637: Performed by: STUDENT IN AN ORGANIZED HEALTH CARE EDUCATION/TRAINING PROGRAM

## 2025-01-20 PROCEDURE — 84460 ALANINE AMINO (ALT) (SGPT): CPT | Performed by: RADIOLOGY

## 2025-01-20 PROCEDURE — 250N000009 HC RX 250: Performed by: RADIOLOGY

## 2025-01-20 PROCEDURE — 96372 THER/PROPH/DIAG INJ SC/IM: CPT | Performed by: RADIOLOGY

## 2025-01-20 PROCEDURE — 85610 PROTHROMBIN TIME: CPT | Performed by: RADIOLOGY

## 2025-01-20 PROCEDURE — 250N000011 HC RX IP 250 OP 636: Performed by: RADIOLOGY

## 2025-01-20 PROCEDURE — 32408 CORE NDL BX LNG/MED PERQ: CPT | Mod: 52

## 2025-01-20 PROCEDURE — 999N000157 HC STATISTIC RCP TIME EA 10 MIN

## 2025-01-20 PROCEDURE — 250N000011 HC RX IP 250 OP 636: Performed by: STUDENT IN AN ORGANIZED HEALTH CARE EDUCATION/TRAINING PROGRAM

## 2025-01-20 PROCEDURE — 250N000011 HC RX IP 250 OP 636

## 2025-01-20 PROCEDURE — 82565 ASSAY OF CREATININE: CPT | Performed by: STUDENT IN AN ORGANIZED HEALTH CARE EDUCATION/TRAINING PROGRAM

## 2025-01-20 PROCEDURE — 36415 COLL VENOUS BLD VENIPUNCTURE: CPT | Performed by: RADIOLOGY

## 2025-01-20 PROCEDURE — 96365 THER/PROPH/DIAG IV INF INIT: CPT

## 2025-01-20 PROCEDURE — 99285 EMERGENCY DEPT VISIT HI MDM: CPT | Mod: 25 | Performed by: STUDENT IN AN ORGANIZED HEALTH CARE EDUCATION/TRAINING PROGRAM

## 2025-01-20 PROCEDURE — 85027 COMPLETE CBC AUTOMATED: CPT | Performed by: STUDENT IN AN ORGANIZED HEALTH CARE EDUCATION/TRAINING PROGRAM

## 2025-01-20 PROCEDURE — 71045 X-RAY EXAM CHEST 1 VIEW: CPT

## 2025-01-20 PROCEDURE — 36415 COLL VENOUS BLD VENIPUNCTURE: CPT | Performed by: STUDENT IN AN ORGANIZED HEALTH CARE EDUCATION/TRAINING PROGRAM

## 2025-01-20 PROCEDURE — 99291 CRITICAL CARE FIRST HOUR: CPT | Mod: 25

## 2025-01-20 PROCEDURE — 99152 MOD SED SAME PHYS/QHP 5/>YRS: CPT

## 2025-01-20 PROCEDURE — 87637 SARSCOV2&INF A&B&RSV AMP PRB: CPT | Performed by: STUDENT IN AN ORGANIZED HEALTH CARE EDUCATION/TRAINING PROGRAM

## 2025-01-20 PROCEDURE — 999N000065 XR CHEST PORT 1 VIEW

## 2025-01-20 PROCEDURE — 99223 1ST HOSP IP/OBS HIGH 75: CPT | Performed by: STUDENT IN AN ORGANIZED HEALTH CARE EDUCATION/TRAINING PROGRAM

## 2025-01-20 PROCEDURE — 85025 COMPLETE CBC W/AUTO DIFF WBC: CPT | Performed by: RADIOLOGY

## 2025-01-20 PROCEDURE — 99153 MOD SED SAME PHYS/QHP EA: CPT

## 2025-01-20 PROCEDURE — 84450 TRANSFERASE (AST) (SGOT): CPT | Performed by: RADIOLOGY

## 2025-01-20 RX ORDER — FLUMAZENIL 0.1 MG/ML
0.2 INJECTION, SOLUTION INTRAVENOUS
Status: DISCONTINUED | OUTPATIENT
Start: 2025-01-20 | End: 2025-01-20 | Stop reason: HOSPADM

## 2025-01-20 RX ORDER — NALOXONE HYDROCHLORIDE 0.4 MG/ML
0.2 INJECTION, SOLUTION INTRAMUSCULAR; INTRAVENOUS; SUBCUTANEOUS
Status: DISCONTINUED | OUTPATIENT
Start: 2025-01-20 | End: 2025-01-20 | Stop reason: HOSPADM

## 2025-01-20 RX ORDER — ATORVASTATIN CALCIUM 40 MG/1
80 TABLET, FILM COATED ORAL AT BEDTIME
Status: DISPENSED | OUTPATIENT
Start: 2025-01-20

## 2025-01-20 RX ORDER — POLYETHYLENE GLYCOL 3350 17 G/17G
17 POWDER, FOR SOLUTION ORAL 2 TIMES DAILY PRN
Status: DISPENSED | OUTPATIENT
Start: 2025-01-20

## 2025-01-20 RX ORDER — HYDROMORPHONE HCL IN WATER/PF 6 MG/30 ML
0.2 PATIENT CONTROLLED ANALGESIA SYRINGE INTRAVENOUS
Status: ACTIVE | OUTPATIENT
Start: 2025-01-20

## 2025-01-20 RX ORDER — ONDANSETRON 2 MG/ML
4 INJECTION INTRAMUSCULAR; INTRAVENOUS ONCE
Status: COMPLETED | OUTPATIENT
Start: 2025-01-20 | End: 2025-01-20

## 2025-01-20 RX ORDER — SODIUM CHLORIDE 9 MG/ML
INJECTION, SOLUTION INTRAVENOUS CONTINUOUS PRN
Status: DISCONTINUED | OUTPATIENT
Start: 2025-01-20 | End: 2025-01-21 | Stop reason: HOSPADM

## 2025-01-20 RX ORDER — NALOXONE HYDROCHLORIDE 0.4 MG/ML
0.4 INJECTION, SOLUTION INTRAMUSCULAR; INTRAVENOUS; SUBCUTANEOUS
Status: ACTIVE | OUTPATIENT
Start: 2025-01-20

## 2025-01-20 RX ORDER — ERGOCALCIFEROL (VITAMIN D2) 10 MCG
1 TABLET ORAL DAILY
Status: ON HOLD | COMMUNITY

## 2025-01-20 RX ORDER — FENTANYL CITRATE 50 UG/ML
25-50 INJECTION, SOLUTION INTRAMUSCULAR; INTRAVENOUS EVERY 5 MIN PRN
Status: DISCONTINUED | OUTPATIENT
Start: 2025-01-20 | End: 2025-01-20 | Stop reason: HOSPADM

## 2025-01-20 RX ORDER — NICOTINE POLACRILEX 4 MG
15-30 LOZENGE BUCCAL
Status: DISCONTINUED | OUTPATIENT
Start: 2025-01-20 | End: 2025-01-21 | Stop reason: HOSPADM

## 2025-01-20 RX ORDER — NICOTINE POLACRILEX 4 MG
15-30 LOZENGE BUCCAL
Status: DISCONTINUED | OUTPATIENT
Start: 2025-01-20 | End: 2025-01-20 | Stop reason: HOSPADM

## 2025-01-20 RX ORDER — ACETAMINOPHEN 325 MG/1
650 TABLET ORAL ONCE
Status: DISCONTINUED | OUTPATIENT
Start: 2025-01-20 | End: 2025-01-20

## 2025-01-20 RX ORDER — ACETAMINOPHEN 325 MG/1
650 TABLET ORAL EVERY 4 HOURS PRN
Status: DISCONTINUED | OUTPATIENT
Start: 2025-01-20 | End: 2025-01-22

## 2025-01-20 RX ORDER — KETAMINE HYDROCHLORIDE 100 MG/ML
1.5 INJECTION, SOLUTION INTRAMUSCULAR; INTRAVENOUS ONCE
Status: DISCONTINUED | OUTPATIENT
Start: 2025-01-20 | End: 2025-01-20

## 2025-01-20 RX ORDER — ACETAMINOPHEN 650 MG/1
650 SUPPOSITORY RECTAL EVERY 4 HOURS PRN
Status: DISCONTINUED | OUTPATIENT
Start: 2025-01-20 | End: 2025-01-22

## 2025-01-20 RX ORDER — KETAMINE HYDROCHLORIDE 10 MG/ML
INJECTION, SOLUTION INTRAMUSCULAR; INTRAVENOUS
Status: COMPLETED
Start: 2025-01-20 | End: 2025-01-20

## 2025-01-20 RX ORDER — DEXTROSE MONOHYDRATE 25 G/50ML
25-50 INJECTION, SOLUTION INTRAVENOUS
Status: CANCELLED | OUTPATIENT
Start: 2025-01-20

## 2025-01-20 RX ORDER — FAMOTIDINE 20 MG/1
20 TABLET, FILM COATED ORAL
Status: DISPENSED | OUTPATIENT
Start: 2025-01-21

## 2025-01-20 RX ORDER — OXYCODONE HYDROCHLORIDE 5 MG/1
5 TABLET ORAL EVERY 4 HOURS PRN
Status: DISPENSED | OUTPATIENT
Start: 2025-01-20

## 2025-01-20 RX ORDER — HYDROMORPHONE HYDROCHLORIDE 1 MG/ML
0.5 INJECTION, SOLUTION INTRAMUSCULAR; INTRAVENOUS; SUBCUTANEOUS EVERY 30 MIN PRN
Status: DISCONTINUED | OUTPATIENT
Start: 2025-01-20 | End: 2025-01-20 | Stop reason: HOSPADM

## 2025-01-20 RX ORDER — ONDANSETRON 2 MG/ML
INJECTION INTRAMUSCULAR; INTRAVENOUS
Status: COMPLETED
Start: 2025-01-20 | End: 2025-01-20

## 2025-01-20 RX ORDER — AMOXICILLIN 250 MG
1 CAPSULE ORAL 2 TIMES DAILY PRN
Status: ACTIVE | OUTPATIENT
Start: 2025-01-20

## 2025-01-20 RX ORDER — LIDOCAINE 40 MG/G
CREAM TOPICAL
Status: DISCONTINUED | OUTPATIENT
Start: 2025-01-20 | End: 2025-01-21 | Stop reason: HOSPADM

## 2025-01-20 RX ORDER — CARVEDILOL 12.5 MG/1
25 TABLET ORAL 2 TIMES DAILY WITH MEALS
Status: DISPENSED | OUTPATIENT
Start: 2025-01-20

## 2025-01-20 RX ORDER — DEXTROSE MONOHYDRATE 25 G/50ML
25-50 INJECTION, SOLUTION INTRAVENOUS
Status: DISCONTINUED | OUTPATIENT
Start: 2025-01-20 | End: 2025-01-20 | Stop reason: HOSPADM

## 2025-01-20 RX ORDER — HYDROMORPHONE HCL IN WATER/PF 6 MG/30 ML
0.4 PATIENT CONTROLLED ANALGESIA SYRINGE INTRAVENOUS
Status: DISPENSED | OUTPATIENT
Start: 2025-01-20

## 2025-01-20 RX ORDER — NICOTINE POLACRILEX 4 MG
15-30 LOZENGE BUCCAL
Status: DISCONTINUED | OUTPATIENT
Start: 2025-01-20 | End: 2025-01-20

## 2025-01-20 RX ORDER — DEXTROSE MONOHYDRATE 25 G/50ML
25-50 INJECTION, SOLUTION INTRAVENOUS
Status: DISCONTINUED | OUTPATIENT
Start: 2025-01-20 | End: 2025-01-20

## 2025-01-20 RX ORDER — NICOTINE POLACRILEX 4 MG
15-30 LOZENGE BUCCAL
Status: CANCELLED | OUTPATIENT
Start: 2025-01-20

## 2025-01-20 RX ORDER — NALOXONE HYDROCHLORIDE 0.4 MG/ML
0.2 INJECTION, SOLUTION INTRAMUSCULAR; INTRAVENOUS; SUBCUTANEOUS
Status: ACTIVE | OUTPATIENT
Start: 2025-01-20

## 2025-01-20 RX ORDER — DEXTROSE MONOHYDRATE 25 G/50ML
25-50 INJECTION, SOLUTION INTRAVENOUS
Status: DISCONTINUED | OUTPATIENT
Start: 2025-01-20 | End: 2025-01-21 | Stop reason: HOSPADM

## 2025-01-20 RX ORDER — PROCHLORPERAZINE MALEATE 5 MG/1
5 TABLET ORAL EVERY 6 HOURS PRN
Status: ACTIVE | OUTPATIENT
Start: 2025-01-20

## 2025-01-20 RX ORDER — ACETAMINOPHEN 10 MG/ML
1000 INJECTION, SOLUTION INTRAVENOUS ONCE
Status: COMPLETED | OUTPATIENT
Start: 2025-01-20 | End: 2025-01-20

## 2025-01-20 RX ORDER — LIDOCAINE 40 MG/G
CREAM TOPICAL
Status: DISCONTINUED | OUTPATIENT
Start: 2025-01-20 | End: 2025-01-20 | Stop reason: HOSPADM

## 2025-01-20 RX ORDER — NALOXONE HYDROCHLORIDE 0.4 MG/ML
0.4 INJECTION, SOLUTION INTRAMUSCULAR; INTRAVENOUS; SUBCUTANEOUS
Status: DISCONTINUED | OUTPATIENT
Start: 2025-01-20 | End: 2025-01-20 | Stop reason: HOSPADM

## 2025-01-20 RX ORDER — LIDOCAINE HYDROCHLORIDE 10 MG/ML
10-20 INJECTION, SOLUTION EPIDURAL; INFILTRATION; INTRACAUDAL; PERINEURAL ONCE
Status: COMPLETED | OUTPATIENT
Start: 2025-01-20 | End: 2025-01-20

## 2025-01-20 RX ORDER — LIDOCAINE 40 MG/G
CREAM TOPICAL
Status: ACTIVE | OUTPATIENT
Start: 2025-01-20

## 2025-01-20 RX ORDER — AMOXICILLIN 250 MG
2 CAPSULE ORAL 2 TIMES DAILY PRN
Status: ACTIVE | OUTPATIENT
Start: 2025-01-20

## 2025-01-20 RX ORDER — ONDANSETRON 4 MG/1
4 TABLET, ORALLY DISINTEGRATING ORAL EVERY 6 HOURS PRN
Status: ACTIVE | OUTPATIENT
Start: 2025-01-20

## 2025-01-20 RX ORDER — ONDANSETRON 2 MG/ML
4 INJECTION INTRAMUSCULAR; INTRAVENOUS EVERY 6 HOURS PRN
Status: DISPENSED | OUTPATIENT
Start: 2025-01-20

## 2025-01-20 RX ORDER — BISACODYL 10 MG
10 SUPPOSITORY, RECTAL RECTAL DAILY PRN
Status: ACTIVE | OUTPATIENT
Start: 2025-01-20

## 2025-01-20 RX ORDER — SODIUM CHLORIDE 9 MG/ML
INJECTION, SOLUTION INTRAVENOUS CONTINUOUS PRN
Status: DISCONTINUED | OUTPATIENT
Start: 2025-01-20 | End: 2025-01-20 | Stop reason: HOSPADM

## 2025-01-20 RX ORDER — LIDOCAINE HYDROCHLORIDE 10 MG/ML
10-20 INJECTION, SOLUTION EPIDURAL; INFILTRATION; INTRACAUDAL; PERINEURAL ONCE
Status: DISCONTINUED | OUTPATIENT
Start: 2025-01-20 | End: 2025-01-20 | Stop reason: HOSPADM

## 2025-01-20 RX ORDER — CALCIUM CARBONATE 500 MG/1
1000 TABLET, CHEWABLE ORAL 4 TIMES DAILY PRN
Status: ACTIVE | OUTPATIENT
Start: 2025-01-20

## 2025-01-20 RX ADMIN — HYDROMORPHONE HYDROCHLORIDE 0.5 MG: 1 INJECTION, SOLUTION INTRAMUSCULAR; INTRAVENOUS; SUBCUTANEOUS at 11:03

## 2025-01-20 RX ADMIN — ONDANSETRON 4 MG: 2 INJECTION INTRAMUSCULAR; INTRAVENOUS at 10:51

## 2025-01-20 RX ADMIN — OXYCODONE HYDROCHLORIDE 2.5 MG: 5 TABLET ORAL at 22:22

## 2025-01-20 RX ADMIN — ATORVASTATIN CALCIUM 80 MG: 40 TABLET, FILM COATED ORAL at 22:22

## 2025-01-20 RX ADMIN — ONDANSETRON 4 MG: 2 INJECTION INTRAMUSCULAR; INTRAVENOUS at 10:52

## 2025-01-20 RX ADMIN — CARVEDILOL 25 MG: 12.5 TABLET, FILM COATED ORAL at 18:52

## 2025-01-20 RX ADMIN — LIDOCAINE HYDROCHLORIDE 10 ML: 10 INJECTION, SOLUTION EPIDURAL; INFILTRATION; INTRACAUDAL; PERINEURAL at 09:31

## 2025-01-20 RX ADMIN — ACETAMINOPHEN 650 MG: 325 TABLET, FILM COATED ORAL at 22:22

## 2025-01-20 RX ADMIN — ACETAMINOPHEN 1000 MG: 10 INJECTION INTRAVENOUS at 11:36

## 2025-01-20 RX ADMIN — KETAMINE HYDROCHLORIDE: 10 INJECTION INTRAMUSCULAR; INTRAVENOUS at 09:57

## 2025-01-20 RX ADMIN — SODIUM CHLORIDE: 9 INJECTION, SOLUTION INTRAVENOUS at 09:15

## 2025-01-20 RX ADMIN — FENTANYL CITRATE 50 MCG: 50 INJECTION, SOLUTION INTRAMUSCULAR; INTRAVENOUS at 10:23

## 2025-01-20 ASSESSMENT — ACTIVITIES OF DAILY LIVING (ADL)
ADLS_ACUITY_SCORE: 41
ADLS_ACUITY_SCORE: 22
ADLS_ACUITY_SCORE: 41
ADLS_ACUITY_SCORE: 41
DOING_ERRANDS_INDEPENDENTLY_DIFFICULTY: NO
ADLS_ACUITY_SCORE: 22
ADLS_ACUITY_SCORE: 41
WALKING_OR_CLIMBING_STAIRS_DIFFICULTY: NO
DIFFICULTY_EATING/SWALLOWING: NO
ADLS_ACUITY_SCORE: 22
ADLS_ACUITY_SCORE: 41
FALL_HISTORY_WITHIN_LAST_SIX_MONTHS: NO
ADLS_ACUITY_SCORE: 22
EQUIPMENT_CURRENTLY_USED_AT_HOME: WALKER, STANDARD
CHANGE_IN_FUNCTIONAL_STATUS_SINCE_ONSET_OF_CURRENT_ILLNESS/INJURY: NO
ADLS_ACUITY_SCORE: 22
ADLS_ACUITY_SCORE: 22
ADLS_ACUITY_SCORE: 41
DRESSING/BATHING_DIFFICULTY: NO
TOILETING_ISSUES: NO

## 2025-01-20 ASSESSMENT — COLUMBIA-SUICIDE SEVERITY RATING SCALE - C-SSRS
1. IN THE PAST MONTH, HAVE YOU WISHED YOU WERE DEAD OR WISHED YOU COULD GO TO SLEEP AND NOT WAKE UP?: NO
1. IN THE PAST MONTH, HAVE YOU WISHED YOU WERE DEAD OR WISHED YOU COULD GO TO SLEEP AND NOT WAKE UP?: NO
2. HAVE YOU ACTUALLY HAD ANY THOUGHTS OF KILLING YOURSELF IN THE PAST MONTH?: NO
2. HAVE YOU ACTUALLY HAD ANY THOUGHTS OF KILLING YOURSELF IN THE PAST MONTH?: NO
6. HAVE YOU EVER DONE ANYTHING, STARTED TO DO ANYTHING, OR PREPARED TO DO ANYTHING TO END YOUR LIFE?: NO
6. HAVE YOU EVER DONE ANYTHING, STARTED TO DO ANYTHING, OR PREPARED TO DO ANYTHING TO END YOUR LIFE?: NO

## 2025-01-20 NOTE — H&P
[facility]    History and Physical - Hospitalist Service       Date of Admission:  1/20/2025      Assessment & Plan      Karen Rodriguez is a 79 year old woman with a  past medical history of pulmonary emphysema, chronic diastolic heart failure, rheumatoid arthritis, prior stroke, hyperlipidemia, hypertension, prior tobacco use who was admitted to the hospital after a pneumothorax during her stereotactic biopsy of her lung.    Principal Problem:    Pneumothorax    Pulmonary emphysema, unspecified emphysema type (H)    Assessment: Pneumothorax after stereotactic biopsy of the right lower lobe concerning for malignancy.  Has a persistent air leak at this time.    Plan:   -Admit inpatient  -Chest tube to continuous suction   -chest x-ray in a.m.  -Consult general surgery  -As needed acetaminophen, oxycodone and hydromorphone for pain  -Supplemental oxygen for reabsorption    Active Problems:    Chronic diastolic heart failure (H)    Hypertensive heart disease with congestive heart failure (H)    Assessment: Appears euvolemic at this time, not on diuretics prior to admission.        Essential (primary) hypertension    Assessment: PTA on amlodipine 5mg daily, Carvedilol 25mg BID.     Plan:   -hold amlodipine 5mg daily  -carvedilol 25mg BID      Rheumatoid arthritis (H)    Assessment: Does not appear to be on any controller medications at this time.    History of Right-sided lacunar infarction (H)    HLD (hyperlipidemia)    Assessment: Prior to admission on atorvastatin 40 mg daily does not appear to be on aspirin.    Plan:   -atorvastatin 40mg daily          Diet: Combination Diet Regular Diet AdultRegular  DVT Prophylaxis: Pneumatic Compression Devices  Echavarria Catheter: Not present  Lines: None     Cardiac Monitoring: None  Code Status: Full CodeFull      Clinically Significant Risk Factors Present on Admission          # Hyperchloremia: Highest Cl = 108 mmol/L in last 2 days, will monitor as  appropriate      # Hypocalcemia: Lowest Ca = 8.1 mg/dL in last 2 days, will monitor and replace as appropriate         # Hypertension: Noted on problem list       # Acute Hypoxic Respiratory Failure: Documented O2 saturation < 90%. Continue supplemental oxygen as needed   # Anemia: based on hgb <11                  Disposition Plan     Medically Ready for Discharge: Anticipated in 2-4 Days           Eron Rodriguez MD  Hospitalist Service  [facility]  Securely message with [employer] (more info)  Text page via [facility] Paging/Directory     ______________________________________________________________________    Chief Complaint   Shortness of breath    History is obtained from the patient and ED physician    History of Present Illness   Karen Rodriguez is a 79 year old woman with a  past medical history of pulmonary emphysema, chronic diastolic heart failure, rheumatoid arthritis, prior stroke, hyperlipidemia, hypertension, prior tobacco use was admitted to the hospital after a pneumothorax.    The patient was undergoing a stereotactic biopsy of her lung for malignancy evaluation when she had a desaturation and became short of breath requiring imaging which found a pneumothorax and a chest tube was subsequently placed at bedside by ED physician.  The patient has had no recent sick contacts.  No fever or chills.  No nausea or vomiting.  No shortness of breath currently.      Past Medical History    Past Medical History:   Diagnosis Date    Abdominal pain, lower 01/16/2016    Abnormal deep tendon reflex 10/12/2007    Slowed, R/O hypothyroidism    Acute airway obstruction 04/23/2019    Acute and chronic respiratory failure with hypoxia (H) 12/15/2019    Acute blood loss anemia 03/17/2016    Acute cystitis without hematuria 04/07/2018    Acute diastolic congestive heart failure (H) 03/26/2018    Acute dyspnea 09/14/2022    Acute gastroenteritis 11/03/2019    Acute lower gastrointestinal bleeding 01/16/2016     Acute pain of left shoulder 03/18/2015    Age-related nuclear cataract, bilateral 08/25/2016    LUKASZ (acute kidney injury) 12/28/2019    Allergic drug rash 04/05/2023    Ametropia 08/25/2016    Anemia 12/13/2021    Anemia due to blood loss 03/15/2016    Arthritis of right knee 12/22/2004    Advanced arthritis of the lumbosacral spine & bilateral wrist arthritis    Asymptomatic LEFT Peroneal Nerve Lesion 10/12/2007    SUSPECT MILD COMPRESSION AXONAL MONONEUROPATHY    B12 deficiency 09/16/2022    Bandemia 07/26/2016    Blood dyscrasia     Bloody stools 07/26/2016    Branch retinal vein occlusion of right eye with macular edema (H) 10/05/2022    Calculus of gallbladder without cholecystitis without obstruction 09/19/2022    Carotid atherosclerosis     Carpal tunnel syndrome 01/18/2005    Bilateral - S/P Release    Cataract     Central retinal vein occlusion with macular edema of left eye (H) 08/29/2023    Chondrocalcinosis due to dicalcium phosphate crystals, lower leg 09/07/2007    Chondromalacia of knee 01/31/2008    Grade 1 throughout the right knee.    Chondromalacia of patella 01/31/2008    Chronic bilateral low back pain without sciatica 08/06/2019    Chronic constipation with overflow 11/14/2018    Chronic diastolic heart failure (H) 06/15/2018    Chronic hyponatremia 04/06/2022    Chronic lumbar radiculopathy 06/26/2013    Chronic obstructive pulmonary disease with (acute) exacerbation (H) 03/28/2018    Chronic pain of right knee 10/31/2017    Chronic vascular insufficiency of intestine (HCC), with stent 01/26/2017    Closed wedge compression fracture of T11 vertebra (H) 02/27/2018    Cognitive impairment 10/26/2022    Colitis, acute 01/16/2016    Colonic ischemia 01/27/2016    Combined forms of age-related cataract, bilateral 02/09/2023    Congestive heart failure (H)     Deconditioned low back 07/23/2019    Decreased mobility 07/23/2019    Decreased range of motion of lumbar spine 07/23/2019    Decreased  vision of right eye 10/22/2022    Degenerative disc disease, lumbar 06/26/2013    Degenerative disc disease, lumbar, S/P lumbar diskectomy 2005    Derangement of lateral meniscus 03/06/2008    Diarrhea, unspecified type 08/10/2018    Difficulty walking 03/24/2016    Elevated liver enzymes 09/19/2022    Epiretinal membrane (ERM) of right eye 08/25/2016    Esophagitis determined by endoscopy 12/29/2019    Essential (primary) hypertension 08/04/2016    Fall from standing, initial encounter 02/27/2018    Femoral neuropathy of left lower extremity 04/07/2018    Gastroenteritis 08/16/2017    Gastroesophageal reflux disease without esophagitis 11/13/2015    Generalized atherosclerosis 10/12/2007    Glenohumeral arthritis, right 08/21/2019    Added automatically from request for surgery [id number]      Habitual alcohol use 09/16/2020    Health care directive on file 08/13/2002    Hematoma 04/05/2018    History of alcohol abuse 08/16/2017    History of atrial fibrillation 10/26/2022    History of blood transfusion     History of CVA with residual deficit 08/24/2023    History of total knee arthroplasty, right 2017    HLD (hyperlipidemia) 01/30/2018    Hx of hepatitis 01/12/2005    in childhood    Hx of hypokalemia 03/28/2018    Hx of Hypomagnesemia 02/27/2018    Hypercalcemia 01/10/2014    Hypertensive heart disease with congestive heart failure (H) 01/18/2005    Hypertensive retinopathy of left eye 04/11/2023    Hypertensive urgency 11/14/2022    Hypo-osmolality and hyponatremia 02/27/2018    Hypoglycemia without diagnosis of diabetes mellitus 04/11/2023    Hypophosphatemia 11/19/2019    Idiopathic small fiber sensory neuropathy 10/12/2007    Impaired functional mobility, balance, gait, and endurance 05/17/2022    Incontinence of bowel 04/05/2018    Iron deficiency 09/16/2022    Lactic acidosis 03/21/2018    Large bowel obstruction (H) 10/07/2020    Lateral meniscal tear 01/31/2008    Partial lateral menisectomy.  DOS 3/6/2008     LLL pneumonia 12/15/2019    Lumbar disc herniation 11/14/2007    Lung nodule 10/03/2024    Lupus     Lymphedema 11/28/2017    Macrocytosis 11/17/2008    Medial meniscus tear 01/31/2008    Right knee arthroscopy with partial medial menisectomy.  DOS 3/6/2008    Melena 07/28/2023    Meralgia paresthetica, right 10/12/2007    Metabolic acidosis, normal anion gap (NAG) 02/28/2018    Moderate protein malnutrition 12/20/2019    Near syncope 11/19/2019    Neurogenic orthostatic hypotension (H) 03/21/2018    Non-traumatic rhabdomyolysis 09/16/2020    Numbness and tingling of left upper and lower extremity 05/19/2022    Old myocardial infarction 12/15/2019    On mechanically assisted ventilation (H) 04/23/2019    Other chest pain 11/14/2022    Paresthesia of both hands 09/14/2022    Personal history of malignant neoplasm of breast 11/01/2017    3/16/21 history of      Poor posture 08/06/2019    Postoperative hypertension 04/06/2022    Primary localized osteoarthrosis, lower leg 10/17/2006    Primary osteoarthritis of right knee 03/24/2016    PUD (peptic ulcer disease)     Pulmonary emphysema, unspecified emphysema type (H) 05/11/2018    Radiculopathy, Right L4 radiculopathy - S/P Lumbar Diskectomy/Foraminotomy 01/24/2005    Rash of both feet 12/12/2023    Rectal bleeding 07/26/2016    Renal infarct 09/07/2016    Rheumatoid arthritis (H) 03/29/2006    Right carotid bruit 11/24/2014    Right-sided lacunar infarction (H) 09/15/2022    Rotator cuff arthropathy, right 02/03/2022    Added automatically from request for surgery [id number]      S/P lens implant 08/24/2023    Sciatica 12/22/2004    Scoliosis of lumbar region due to degenerative disease of spine in adult 07/24/2019    Senile osteoporosis 06/20/2018    Shortness of breath 03/26/2018    Solitary pulmonary nodule 07/19/2022    Spinal arthritis 12/22/2004    Spondylosis of lumbar region without myelopathy or radiculopathy 11/13/2015    Stable branch retinal vein occlusion  of right eye (H) 10/05/2022    Stiffness of upper arm joint, left 11/28/2017    Subacute cutaneous lupus erythematosus 02/27/2018    Tobacco use disorder 01/18/2005    1.5 ppd    Type 2 acute myocardial infarction (H) 12/15/2019    Added automatically from request for surgery [id number]      Upper GI bleed 12/28/2019    Urinary retention 04/05/2018    Vasovagal syncope 10/02/2018    Visual disturbance 09/14/2022    Vitamin D deficiency 09/16/2022    Vitreomacular traction, right 10/05/2022    Weakness of right leg 03/24/2016       Past Surgical History   Past Surgical History:   Procedure Laterality Date     BRONCHOSCOPY ROBOTIC ASSISTED NAVIGATION- with Application of fiducial markers.  With fluoroscopy and radial ultrasound.  11/18/2024    Surgeon: Zelalem Albert MD;  Location: [facility]    ANOSCOPY with biopsies, PROCTOSCOPY with biopsies  11/12/2018    Surgeon: Edwin Yoon MD;  Location: [facility] OR    ARTHROPLASTY TOTAL KNEE right Right 03/15/2016    RUTH PSI;  Surgeon: Ramon Calles MD;  Location: [facility] OR    AS ESOPHAGOSCOPY, DIAGNOSTIC  07/25/2023    Surgeon: Ten Machuca MD;  Location: [facility]    AS LIGATE FALLOPIAN TUBE  1973    AS REVISE MEDIAN N/CARPAL TUNNEL SURG  2003    Bilateral - Right wrist in 11/02    BIOPSY OF BREAST, NEEDLE CORE Left 10/17/2017    infiltrating lobular carcinoma    BRONCHOSCOPY N/A 11/18/2024    Procedure: BRONCHOSCOPY-;  Surgeon: Zelalem Albert MD;  Location: [facility]    CARDIAC CATHETERIZATION Left 12/16/2019    Procedure: CV LHC, CORONARY ANGIOGRAPHY;  Surgeon: Haile Meraz MD;  Location: [facility] CATH LAB    CATARACT, left Left 08/23/2023    Dr. Dougherty    COLONOSCOPY      COLONOSCOPY N/A 01/18/2016    Procedure: COLONOSCOPY SCREENING WITH BIOPSIES ;  Surgeon: Gian Parks MD;  Location: [facility] OR    COLONOSCOPY N/A 08/24/2016    Procedure: COLONOSCOPY DIAGNOSTIC with polypectomy;  Surgeon: Edwin Yoon MD;  Location: [facility] OR     COLONOSCOPY N/A 11/08/2016    Procedure: anoscopy, proctoscopy and flexible sigmoidoscopy with snare polypectomy;  Surgeon: Edwin Yoon MD;  Location: -[facility] OR    COLONOSCOPY  11/14/2017    Procedure: COLONOSCOPY SCREENING-high risk with biopsies and polypectomy;  Surgeon: Edwin Yoon MD;  Location: -[facility] OR    COLONOSCOPY N/A 10/09/2020    Procedure: COLONOSCOPY DIAGNOSTIC with polypectomies;  Surgeon: Herbert Matthew MD;  Location: -[facility] OR    COLONOSCOPY,FLEX,SCREEN   04/01/2013    Dr Parks at [facility]-normal    DIAGNOSTIC ANOSCOPY  05/02/2017    Rectal Exam/Anoscopy and proctoscopy    DILATION AND CURETTAGE      1970's; DIAGNOSTIC;    ECG 12 LEAD TC & PC  2005    Normal (done in [place]).    EGD  08/11/2019    Surgeon: Lucas Soto MD;  Location: [facility]    EGD  11/08/2019    Surgeon: Lorenzo Barakat MD;  Location: [facility]    EGD  12/29/2019    Surgeon: Jeremie Martínez MD;  Location: [facility] ORS    EGD  03/03/2020    Surgeon: Lorenzo Barakat MD;  Location: [facility]    ENDOBRONCHIAL ULTRASOUND  11/18/2024    endoscopic ultrasonography  11/08/2019    ESOPHAGOGASTRODUODENOSCOPY DIAGNOSTIC with biopsies  09/07/2018    Surgeon: Frances Parks MD;  [facility]: [facility] OR    ESOPHAGOGASTRODUODENOSCOPY DIAGNOSTIC WITH BIOPSIES   01/18/2016    Surgeon: Gian Parks MD;  Location: [facility] OR    flexible sigmoidoscopy with biopsies  10/07/2020    HC NEEDLE EMG 1 EXTREMITY  09/19/2002    INJ,FORAMEN,L/S,1 LEVEL W/IMAGE GUIDE  11/14/2007    INTRAOCULAR LENS PROSTHESIS INSERTION Left 08/23/2023    Procedure: LEFT EYE CATARACT EXTRACTION WITH INTRAOCULAR LENS IMPLANT.;  Surgeon: Ant Dougherty MD;  Location: [facility] OR    KNEE DEBRIDEMENT Right 11/17/2015    Procedure: RIGHT KNEE ARTHROSCOPY, DEBRIDEMENT, PARTIAL MEDIAL MENISCECTOMY, AND PARTIAL LATERAL MENISCECTOMY;  Surgeon: Ramon Calles MD;  Location: [facility] OR    KNEE SCOPE,SINGLE MENISCECTOMY  03/06/2008     LAMINEC/FACETECT/FORAMIN,LUMBAR- Right L3-4 & L4-5 microdiscectomies, right L3 & L4 foraminotomies  01/24/2005    LUMPECTOMY BREAST Left 01/03/2018    infiltrating lobular carcinoma    parotidectomy with facial nerve monitoring Left 10/21/2015    Surgeon: Micheal Thomas MD;  Location: [facility] MAIN ORS    TX INJ LUMBAR/SACRAL,W/WO CNTRST  08/08/2006    Also 7/21/06    right reverse total shoulder arthroplasty Right 04/05/2022    Surgeon: Ulises Galloway MD;  Location: [facility] MAIN ORS    Selective mesenteric arteriogram; celiac and superior mesenteric artery balloon expandable stent placement Left 09/02/2016    SIGMOIDOSCOPY FLEXIBLE  10/07/2020    Surgeon: Herbert Matthew MD;  Location: [facility] OR       Prior to Admission Medications   Prior to Admission Medications   Prescriptions Last Dose Informant Patient Reported? Taking?   Vitamin D, Cholecalciferol, 10 MCG (400 UNIT) TABS 1/19/2025 Morning  Yes Yes   Sig: Take 1 tablet by mouth daily.   acetaminophen (TYLENOL) 325 MG tablet Past Month  Yes Yes   Sig: Take 325 mg by mouth every 4 hours as needed for pain.   amLODIPine (NORVASC) 5 MG tablet 1/19/2025 Morning  Yes Yes   Sig: Take 1 tablet by mouth daily.   atorvastatin (LIPITOR) 40 MG tablet 1/19/2025 Evening  Yes Yes   Sig: Take 1 Tablet by mouth with supper.   carvedilol (COREG) 25 MG tablet 1/19/2025 Evening  Yes Yes   Sig: Take 1 tablet by mouth 2 times daily (with meals).   famotidine (PEPCID) 20 MG tablet 1/19/2025 Evening  Yes Yes   Sig: Take 1 tablet by mouth 2 times daily.   vitamin C (ASCORBIC ACID) 500 MG tablet 1/19/2025 Morning  Yes Yes   Sig: Take 1 tablet by mouth daily.      Facility-Administered Medications: None           Physical Exam   Vital Signs: Temp: (!) 96.3  F (35.7  C) Temp src: Tympanic BP: 122/52 Pulse: 52   Resp: 20 SpO2: 98 % O2 Device: Oxymask Oxygen Delivery: 1 LPM  Weight: 96 lbs 1.92 oz  General: Pleasant 79-year-old woman lying in bed no acute distress appears thin  CV: Regular  rate and rhythm no peripheral edema appreciated  Pulmonary: Unlabored breathing.  Scattered end expiratory wheezes throughout.  Subcutaneous emphysema superior to the right sided chest tube posterior.  GI: Soft nontender abdomen  Neuro: Alert and oriented      Medical Decision Making       79 MINUTES SPENT BY ME on the date of service doing chart review, history, exam, documentation & further activities per the note.      Data     I have personally reviewed the following data over the past 24 hrs:    4.7  \   9.3 (L)   / 167     136 108 (H) 23.1 (H) /  91   4.3 24 1.10 (H) \     ALT: 26 AST: 40 AP: 199 (H) TBILI: 0.4   ALB: 3.5 TOT PROTEIN: 8.4 (H) LIPASE: N/A     INR:  1.03 PTT:  N/A   D-dimer:  N/A Fibrinogen:  N/A       Imaging results reviewed over the past 24 hrs:   Recent Results (from the past 24 hours)   XR Chest Port 1 View    Narrative    XR CHEST PORT 1 VIEW    HISTORY: 79 yearsFemale chest tube placement    TECHNIQUE: A single view of the chest was performed    COMPARISON: 11/18/2024    FINDINGS: A right chest tube is been placed. The tip is at the apex.  There is a small amount of air in the right lateral chest wall.    There is a very small right basilar pneumothorax. There is no  significant pneumothorax at this time. There is no mediastinal shift.        Impression    IMPRESSION: Appropriate position of right chest tube. Very small right  pneumothorax.    ADITI ALLEN MD         SYSTEM ID:  [id number]   CT Lung Mediastinum Biopsy    Narrative    CT LUNG MEDIASTINUM BIOPSY    HISTORY: 79 years Female RLL FDG avid lesion; NSCLC of right lung (H)    COMPARISON: None    TECHNIQUE: Informed consent was obtained. A timeout was performed. The  patient was sterilely prepped and draped. Local injections of  lidocaine with a 25-gauge needle were performed.    Immediately after the injection of lidocaine, the biopsy introducer or  was inserted 2.2 cm into the intercostal right chest wall. Imaging  was  performed demonstrating presence of a small pneumothorax. The  needle/introducer was immediately withdrawn.    Within one or two minutes, patient started to complain of right chest  pain which increased in severity. Pain became severe and patient  became noncommunicative. Vital signs remained stable. A limited CT  through the site demonstrated increasing size of the right  pneumothorax.    Rapid response was called.      Impression    Impression: Biopsy was aborted during the procedure before samples  could be obtained. The biopsy needle introducer was advanced into the  chest wall, superficial to the pleural space.. Pneumothorax likely  occurred during injection of lidocaine. A rapid response was called as  the patient's condition deteriorated.    ADITI ALLEN MD         SYSTEM ID:  [id number]

## 2025-01-20 NOTE — ED NOTES
Face to face report given with opportunity to observe patient.    Report given to Tierra LEVI RN   1/20/2025  3:27 PM

## 2025-01-20 NOTE — DISCHARGE INSTRUCTIONS
"DISCHARGE INSTRUCTIONS  CT Scan Directed Needle Biopsy      IMPORTANT: As you prepare for discharge, the following information will help you return to your best level of health.       This Information Is About Your Follow Up Care     Call your doctor if you do not get better. Call sooner if you feel worse. You can reach your doctor by calling their clinic phone number.                                    This Information Is About Procedures    CT SCAN DIRECTED NEEDLE BIOPSY.  Today we removed some tissue to check for abnormal cells. We will examine that tissue and your doctor will call you with the results.  Your biopsy site may be tender up to one week.  You need someone to drive you home and stay with you for the next 24 hours.  You should not do any strenuous activity or heavy lifting for the next 24 hours.    The following day, you may resume your regular work/activity schedule if you are feeling alright.  You may remove your dressing in 24 hours.  After the dressing is removed you may shower.    Call your doctor if you have:   Drainage, redness, swelling, red streaks, excess pain, swelling at the biopsy site or the intravenous needle site.  Pain not helped by your pain medicine.  Chills or a fever over 101 degrees (by mouth).  Trouble breathing.  Nausea and vomiting    Light-headedness or dizziness  Any new problems or concerns.                        This Information Is About Your Illness and Diagnosis      LUNG NODULE    What is a lung nodule?   A lung nodule, or \"solitary pulmonary nodule,\" is a small, round mass in the lung.  A lung nodule is small, less than 3 centimeters in size, and is usually found \"by chance\" when an x-ray is done of the chest for another reason.  Most lung nodules are not cancer, though some are.  A lung nodule can be a sign of early lung cancer. It is important to have a nodule checked out, so that if the nodule is cancer, you can treat the cancer as early as possible.      What " "causes a lung nodule?  Many things can cause a lung nodule, including:    Cancer   Infection    Inflammation    Tapeworm    Lack of blood flow to the lung due to a blood clot    Rheumatoid arthritis    Wegener granulomatosis (a type of inflammation of small blood vessels throughout the body)  Blood vessel abnormalities  Gas or fluid collected in a cyst    Many other causes    When you go home, follow these instructions:  Don't smoke!  Keep appointments for further chest x-rays to check for changes in the nodule  Keep follow up appointments with your doctor  Avoid traveling to areas where tuberculosis or mycosis is common  Avoid exposure to substances like asbestos, radon, chromium, vinyl chloride, or nickel    Call your doctor if you have:  Any new symptoms, such as cough or trouble breathing  Any questions or concerns.        PULMONARY ABSCESS    A pulmonary abscess is a pocket of infection in the lung.  The infection causes some cells to die (called \"necrosis\") and creates a pocket of old dead cells and fluid or pus.      What causes a pulmonary abscess?  Usually a pulmonary abscess is caused by germs from the mouth that get into the lungs.  It is most common in people who have tooth decay and other periodontal disease.  When germs from the mouth get into the lungs (called \"aspiration\"), the germs cause an infection.  Over time, usually about 1 to 2 weeks, a pulmonary abscess can form.      There are other causes for pulmonary abscess, such as lung cancer, blood clots in the lung, or other problems, but these are much less common.    What are the risk factors for pulmonary abscess?  Being elderly  Having dental disease  Having a stroke or other problem that causes you to have trouble swallowing   Having a seizure  Using too much alcohol or other drugs     When you go home, follow these instructions:  Don't smoke!  Take good care of your teeth and see the dentist regularly.    Raise the head of your bed at home.  " This helps to keep fluids from your stomach from getting into your lungs when you are lying down.  Keep follow up appointments with your doctor.  Keep follow up appointments for chest x-rays to monitor your progress.    Don't abuse alcohol or drugs.    Call your doctor if you have:   Increased cough  Change in the phlegm you are coughing up    Bloody phlegm when you cough  Trouble breathing    Night sweating  Continued weight loss  Any questions or concerns.        LUNG CANCER.    Cancer is a growth of abnormal cells in your lung.  The exact cause of cancer is unknown. Treatment depends on each patient.  We use surgery, medicines, and radiation to treat cancer.  Your doctor will advise you on what treatment is best for you. Additional information is available from the American Cancer Society by calling (367) 818-4917. Their website is www.CANCER.org. Another source of information is the Cancer Information Clearinghouse (725) 4-CANCER.    The lungs are a major part of the respiratory system. They are a pair of cone-shaped organs made up of pinkish-gray, spongy tissue.  They occupy most of the chest cavity.  Air enters the body through the nose and mouth and travels down the throat into the lungs.    Lung cancers are generally divided into two types: small cell lung cancer and nonsmall cell lung cancer.  Small cell lung cancer is sometimes called oat cell cancer.  The tumor cells of each type grow and spread differently, and they are treated differently.    Do the following:  Return to your normal activity.  See your doctor regularly.  Eat a health diet.  Learn all you can about your cancer.  If you smoke, STOP!    Call your doctor if you have:  Chills or fever.  Increased shortness of breath.  Blood in your sputum.  Any change in the color of the mucus you cough up from your lungs.  Any new problems or concerns.        LUNG CANCER - ADENOCARCINOMA    The lungs are a major part of the respiratory system. They are a  "pair of cone-shaped organs made up of pinkish-gray, spongy tissue.  They occupy most of the chest cavity.  Air enters the body through the nose and mouth and travels down the throat into the lungs.  Cancer is the growth of abnormal cells in your body. Once the cells grow out of control, they form a tumor.    Lung cancers are generally divided into two types: small cell lung cancer and non-small cell lung cancer.  Adenocarcinoma, also known as a non-small cell lung cancer, is cancer that starts in the \"epithelium\". Epithelial cells line the organs inside of your body, such as your lungs and colon. The cells also are found in your skin. Adenocarcinoma of the lung is the most common type of lung cancer. This type of lung cancer is seen in young people who do not smoke or have quit smoking.    Treatment for adenocarcinoma of the lung usually involves removing part of all of the lung. Depending on the stage of your cancer, you may also have chemotherapy (cancer-fighting medicines) and radiation before or after your surgery.  This type of cancer can only be cured when surgery or radiation removes all of the tumor. The outcome depends on your overall health and the extent of your cancer. Your doctor will advise you on what treatment is best for you. Additional information is available from the American Cancer Society by calling (986) 240-3855. Their website is www.CANCER.org. Another source of information is the Cancer Information Clearinghouse (594) 4-CANCER.    At home, follow these instructions:  Rest when you feel tired or have shortness of breath.  Keep all follow-up appointments with your doctor and lab.  Eat a healthy diet and stay away from people who smoke.  Learn all you can about your type of cancer.    Call your doctor if you have:  Chills or fever.  Increased shortness of breath.  Blood in your sputum.  Any change in the color of the mucus you cough up from your lungs or Any new problems or concerns.           "

## 2025-01-20 NOTE — PROVIDER NOTIFICATION
01/20/25 1749   Valuables   Patient Belongings other (see comments)   Did you bring any home meds/supplements to the hospital?  No       Patient came VIA EMS from [place] [facility]. Stated that her daughter took belongings home with her from [place].     A               Admission:  I am responsible for any personal items that are not sent to the safe or pharmacy.  [facility] is not responsible for loss, theft or damage of any property in my possession.    Signature:  _________________________________ Date: _______  Time: _____                                              Staff Signature:  ____________________________ Date: ________  Time: _____      2nd Staff person, if patient is unable/unwilling to sign:    Signature: ________________________________ Date: ________  Time: _____     Discharge:  [facility] has returned all of my personal belongings:    Signature: _________________________________ Date: ________  Time: _____                                          Staff Signature:  ____________________________ Date: ________  Time: _____

## 2025-01-20 NOTE — ED NOTES
Pt asleep, RR even and unlabored.  Chest tube does have some bloody drainage in the tubing same as when it was ordinally placed  daughter at bedside.  BP have been on the lower side but patient is laying on her left side. Informed pt daughter if they remain low we can wake her and move her on her back.

## 2025-01-20 NOTE — ED PROVIDER NOTES
[facility]  ED Provider Note    Chief Complaint   Patient presents with    Pneumothorax     History:  Karen Rodriguez is a 79 year old female with with extensive past medical history presents to the emergency department after rapid response was called in the diagnostic imaging department with the development of a pneumothorax.  She was getting a lung biopsy, and had lidocaine and then developed acute distress.  The radiologist did a CT scan and diagnosed pneumothorax.  prior to arrival in the emergency department I did place chest tube.  Upon arrival to the emergency department.  Patient is unable to provide a history.  Provider    Review of Systems   Performed; see HPI for pertinent positives and negatives.     Medical history, surgical history, and social history was reviewed.  Nursing documentation, triage note, and vitals were reviewed.    Vitals:  BP: 114/68  Pulse: 61  Temp: (!) 96.3  F (35.7  C)  Resp: 20  SpO2: 100 %    Physical Exam:  Constitutional: In distress lying on her left side  HENT: NCAT   Eyes: Normal pupils   Neck: supple   CV: No pallor  Pulmonary/Chest: Labored respiration  Abdominal: non-distended   MSK: SHELDON.   Neuro: Alert and appropriate   Skin: Warm and dry. No diaphoresis. No rashes on exposed skin    Psych: Appropriate mood and affect       MDM:      ED Course as of 01/20/25 1604   Mon Jan 20, 2025   1210 79-year-old female here in the emergency department after an emergent chest tube was placed by myself.  Unclear whether the etiology was spontaneous pneumothorax or related to the procedure, however, management remains the same.  Given her worsening oxygen requirements decreasing blood pressures, reasonable for emergent placement of chest tube.  This was performed in the CT scanner, prior to arrival to the emergency department   1340 On arrival here chest x-ray confirmed appropriate placement and improvement in the pneumothorax.  Patient will require admission.   1341  Case discussed with our surgeon who would be happy to take the patient but wants to be able to consult medicine because of the numerous medical problems patient has.  I did reach out to my hospitalist, however, we appear to be capped on the patient's for the hospitalist service, therefore I did reach out to grand [facility].    5280  I have heard that medicine and surgery are both aware of the patient and are trying to decide who will take the patient primarily.    1600 Patient accepted for transfer and transferred in stable condition       Procedures:  [facility]    -Chest Tube Insertion    Date/Time: 1/20/2025 10:46 AM    Performed by: Myron Craig MD  Authorized by: Myron Craig MD    Emergent condition/consent implied    ED EVALUATION:      I have performed an Emergency Department Evaluation including taking a history and physical examination, this evaluation will be documented in the electronic medical record for this ED encounter.      ASA Class: Class 2- mild systemic disease, no acute problems, no functional limitations    Mallampati: Grade 3- soft palate visible, posterior pharyngeal wall not visible    NPO Status: not NPO, emergent situation    UNIVERSAL PROTOCOL   Site Marked: Yes  Prior Images Obtained and Reviewed:  Yes  Required items: Required blood products, implants, devices and special equipment available    Patient identity confirmed:  Arm band  Patient was reevaluated immediately before administering moderate or deep sedation or anesthesia  Confirmation Checklist:  Correct equipment/implants were available, procedure was appropriate and matched the consent or emergent situation and relevant allergies  Universal Protocol: the Joint Commission Universal Protocol was followed    Preparation: Patient was prepped and draped in usual sterile fashion    ESBL (mL):  0    PRE-PROCEDURE DETAILS:     Skin preparation:  ChloraPrep    SEDATION  Patient Sedated: Yes    Sedation Type:  Moderate  (conscious) sedation  Sedation:  Ketamine  Vital signs: Vital signs monitored during sedation      ANESTHESIA (see MAR for exact dosages):     Anesthesia method:  Local infiltration    Local anesthetic:  Lidocaine 1% w/o epi      PROCEDURE DETAILS:     Placement location:  R lateral    Scalpel size:  11    Tube size (Fr):  24    Dissection instrument:  Finger and Holly clamp    Ultrasound guidance: no      Tension pneumothorax: yes      Tube connected to:  Suction and water seal    Drainage characteristics:  Air only    Suture material:  0 silk    Dressing:  4x4 sterile gauze and petrolatum-impregnated gauze      POST-PROCEDURE DETAILS:     Post-insertion x-ray findings: tube in good position      PROCEDURE  Describe Procedure: Patient was placed in a semirecumbent position with the head of the bed at 30 degree.  The side noted above was prepped and the patient was medicated as above. An incision was made and blunt dissection up and over the rib was performed. Access was then obtained to the pleural cavity. A chest tube was inserted and secured as above.  Patient Tolerance:  Patient tolerated the procedure well with no immediate complications  Length of time physician/provider present for 1:1 monitoring during sedation: 20         Critical Care Addendum  My initial assessment, based on my review of prehospital provider report, focused history, physical exam, and discussion with Radiologist , established a high suspicion that Karen Rodriguez has  rapidly expanding pneumothorax with tension physiology , which requires immediate intervention, and therefore she is critically ill.     After the initial assessment, the care team initiated IV fluid administration, initiated medication therapy with ketamine, and performed chest tube placement  to provide stabilization care. Due to the critical nature of this patient, I reassessed nursing observations, vital signs, physical exam, review of cardiac rhythm monitor, mental  status, and respiratory status multiple times prior to her disposition.     Time also spent performing documentation, discussion with family to obtain medical information for decision making, and coordination of care.     Critical care time (excluding teaching time and procedures): 15 minutes.       Impression:  Final diagnoses:   Pneumothorax on right            Myron Craig MD  01/20/25 1240       Myron Craig MD  01/20/25 7651

## 2025-01-20 NOTE — PROGRESS NOTES
Procedure was started at 0930. Patient developed pain in her right arm shortly after lidocaine was injected. Pneumothorax was confirmed on CT scan immediately. Patient quickly deteriorated developing shortness of breath and chest pain. Rapid response was called at 0937. Vital signs as charted. 2nd IV placed, #18 in left upper arm. Chest tube was placed in CT by ER MD. Fentanyl 50 mg IV was given at 1022 per verbal order per ER MD. Patient transported to ER via cart at 1038. Daughter, Leon, was in waiting area. She was updated and brought to ER to be with patient. All patient belongings were brought from [facility] to ER room.

## 2025-01-20 NOTE — ED TRIAGE NOTES
Rapid response was called to CT room. Pt was here to have a CT guided mediastinum biopsy, per reports that shortly after the injection of lidocaine pt experienced SOB and chest a CT was done and confirmed pt  had a pneumo.  ER provider at side of patient and to place chest tube.  Prior to administration of chest tube, verbal order for Ketamine was give to help calm patient and help to control pain

## 2025-01-20 NOTE — PROGRESS NOTES
ADMISSION NOTE    Patient admitted to room 352 at approximately 1700 via EMS from emergency room. Patient was accompanied by transport tech and nurse.     Verbal SBAR report received from Zeb ER RN prior to patient arrival.     Patient trasferred to bed via [facility] staff. Patient alert and oriented X 3. Pain is controlled without any medications.  . Admission vital signs: Blood pressure 122/52, pulse 52, temperature (!) 96.3  F (35.7  C), temperature source Tympanic, resp. rate 20, weight 43.6 kg (96 lb 1.9 oz), SpO2 98%. Patient was oriented to plan of care, call light, bed controls, tv, telephone, bathroom, and visiting hours.     Risk Assessment    The following safety risks were identified during admission: fall and skin. Yellow risk band applied: YES.       Education    Patient has a Gerald to Observation order: No  Observation education completed and documented: N/A      Bonita Mcfadden RN

## 2025-01-21 ENCOUNTER — APPOINTMENT (OUTPATIENT)
Dept: GENERAL RADIOLOGY | Facility: OTHER | Age: 80
End: 2025-01-21
Attending: STUDENT IN AN ORGANIZED HEALTH CARE EDUCATION/TRAINING PROGRAM
Payer: MEDICARE

## 2025-01-21 ENCOUNTER — APPOINTMENT (OUTPATIENT)
Dept: GENERAL RADIOLOGY | Facility: OTHER | Age: 80
DRG: 199 | End: 2025-01-21
Attending: SURGERY
Payer: MEDICARE

## 2025-01-21 LAB
ANION GAP SERPL CALCULATED.3IONS-SCNC: 7 MMOL/L (ref 7–15)
BUN SERPL-MCNC: 29.4 MG/DL (ref 8–23)
CALCIUM SERPL-MCNC: 8.2 MG/DL (ref 8.8–10.4)
CHLORIDE SERPL-SCNC: 108 MMOL/L (ref 98–107)
CREAT SERPL-MCNC: 1.06 MG/DL (ref 0.51–0.95)
EGFRCR SERPLBLD CKD-EPI 2021: 53 ML/MIN/1.73M2
ERYTHROCYTE [DISTWIDTH] IN BLOOD BY AUTOMATED COUNT: 13.2 % (ref 10–15)
GLUCOSE SERPL-MCNC: 75 MG/DL (ref 70–99)
HCO3 SERPL-SCNC: 23 MMOL/L (ref 22–29)
HCT VFR BLD AUTO: 28.9 % (ref 35–47)
HGB BLD-MCNC: 9.1 G/DL (ref 11.7–15.7)
MCH RBC QN AUTO: 30.5 PG (ref 26.5–33)
MCHC RBC AUTO-ENTMCNC: 31.5 G/DL (ref 31.5–36.5)
MCV RBC AUTO: 97 FL (ref 78–100)
PLATELET # BLD AUTO: 182 10E3/UL (ref 150–450)
POTASSIUM SERPL-SCNC: 4.2 MMOL/L (ref 3.4–5.3)
RBC # BLD AUTO: 2.98 10E6/UL (ref 3.8–5.2)
SODIUM SERPL-SCNC: 138 MMOL/L (ref 135–145)
WBC # BLD AUTO: 4.1 10E3/UL (ref 4–11)

## 2025-01-21 PROCEDURE — 71045 X-RAY EXAM CHEST 1 VIEW: CPT

## 2025-01-21 PROCEDURE — 80048 BASIC METABOLIC PNL TOTAL CA: CPT | Performed by: STUDENT IN AN ORGANIZED HEALTH CARE EDUCATION/TRAINING PROGRAM

## 2025-01-21 PROCEDURE — 99233 SBSQ HOSP IP/OBS HIGH 50: CPT | Mod: 25 | Performed by: STUDENT IN AN ORGANIZED HEALTH CARE EDUCATION/TRAINING PROGRAM

## 2025-01-21 PROCEDURE — 71045 X-RAY EXAM CHEST 1 VIEW: CPT | Mod: 77

## 2025-01-21 PROCEDURE — 99231 SBSQ HOSP IP/OBS SF/LOW 25: CPT | Performed by: SURGERY

## 2025-01-21 PROCEDURE — 85014 HEMATOCRIT: CPT | Performed by: STUDENT IN AN ORGANIZED HEALTH CARE EDUCATION/TRAINING PROGRAM

## 2025-01-21 PROCEDURE — 250N000013 HC RX MED GY IP 250 OP 250 PS 637: Performed by: STUDENT IN AN ORGANIZED HEALTH CARE EDUCATION/TRAINING PROGRAM

## 2025-01-21 PROCEDURE — 120N000001 HC R&B MED SURG/OB

## 2025-01-21 PROCEDURE — 36415 COLL VENOUS BLD VENIPUNCTURE: CPT | Performed by: STUDENT IN AN ORGANIZED HEALTH CARE EDUCATION/TRAINING PROGRAM

## 2025-01-21 RX ADMIN — ATORVASTATIN CALCIUM 80 MG: 40 TABLET, FILM COATED ORAL at 22:37

## 2025-01-21 RX ADMIN — ACETAMINOPHEN 650 MG: 325 TABLET, FILM COATED ORAL at 18:15

## 2025-01-21 RX ADMIN — OXYCODONE HYDROCHLORIDE 5 MG: 5 TABLET ORAL at 10:43

## 2025-01-21 RX ADMIN — OXYCODONE HYDROCHLORIDE 5 MG: 5 TABLET ORAL at 22:37

## 2025-01-21 RX ADMIN — OXYCODONE HYDROCHLORIDE 5 MG: 5 TABLET ORAL at 18:15

## 2025-01-21 RX ADMIN — FAMOTIDINE 20 MG: 20 TABLET, FILM COATED ORAL at 10:16

## 2025-01-21 ASSESSMENT — ACTIVITIES OF DAILY LIVING (ADL)
ADLS_ACUITY_SCORE: 22
ADLS_ACUITY_SCORE: 42
ADLS_ACUITY_SCORE: 42
ADLS_ACUITY_SCORE: 22
ADLS_ACUITY_SCORE: 42
ADLS_ACUITY_SCORE: 22
ADLS_ACUITY_SCORE: 42
ADLS_ACUITY_SCORE: 42
ADLS_ACUITY_SCORE: 22
ADLS_ACUITY_SCORE: 22
ADLS_ACUITY_SCORE: 46
ADLS_ACUITY_SCORE: 22
ADLS_ACUITY_SCORE: 42
ADLS_ACUITY_SCORE: 22
ADLS_ACUITY_SCORE: 22
ADLS_ACUITY_SCORE: 42
ADLS_ACUITY_SCORE: 22
ADLS_ACUITY_SCORE: 42

## 2025-01-21 NOTE — PHARMACY
Pharmacy- Renal Dose Adjustment    Patient Active Problem List   Diagnosis    Moderate protein malnutrition    Chronic diastolic heart failure (H)    Pulmonary emphysema, unspecified emphysema type (H)    Chronic obstructive pulmonary disease with (acute) exacerbation (H)    Neurogenic orthostatic hypotension (H)    Rheumatoid arthritis (H)    Hypertensive heart disease with congestive heart failure (H)    Health care directive on file    Personal history of malignant neoplasm of breast    Lung nodule    Rash of both feet    Central retinal vein occlusion with macular edema of left eye (H)    History of CVA with residual deficit    S/P lens implant    Hypertensive retinopathy of left eye    Hypoglycemia without diagnosis of diabetes mellitus    Allergic drug rash    Combined forms of age-related cataract, bilateral    Other chest pain    Hypertensive urgency    Cognitive impairment    Decreased vision of right eye    Stable branch retinal vein occlusion of right eye (H)    Branch retinal vein occlusion of right eye with macular edema (H)    Vitreomacular traction, right    Elevated liver enzymes    Calculus of gallbladder without cholecystitis without obstruction    B12 deficiency    Iron deficiency    Vitamin D deficiency    Right-sided lacunar infarction (H)    Visual disturbance    Acute dyspnea    Paresthesia of both hands    Solitary pulmonary nodule    Numbness and tingling of left upper and lower extremity    Impaired functional mobility, balance, gait, and endurance    Chronic hyponatremia    Postoperative hypertension    Rotator cuff arthropathy, right    Anemia    Habitual alcohol use    Non-traumatic rhabdomyolysis    Esophagitis determined by endoscopy    Upper GI bleed    Type 2 acute myocardial infarction (H)    Glenohumeral arthritis, right    Scoliosis of lumbar region due to degenerative disease of spine in adult    Chronic constipation with overflow    Diarrhea, unspecified type    Senile  osteoporosis    Femoral neuropathy of left lower extremity    Incontinence of bowel    Shortness of breath    Fall from standing, initial encounter    Subacute cutaneous lupus erythematosus    HLD (hyperlipidemia)    Epiretinal membrane (ERM) of right eye    Age-related nuclear cataract, bilateral    Ametropia    Essential (primary) hypertension    Acute blood loss anemia    Anemia due to blood loss    Gastroesophageal reflux disease without esophagitis    Spondylosis of lumbar region without myelopathy or radiculopathy    Acute pain of left shoulder    Right carotid bruit    Hypercalcemia    NSCLC of right lung (H)    Pneumothorax        Relevant Labs:  Recent Labs   Lab Test 01/20/25  1405 01/20/25  0803   WBC 4.7 4.4   HGB 9.3* 10.8*    217        CrCl: 28.1 mL/min    No intake or output data in the 24 hours ending 01/20/25 1821       Per Renal Dose Adjustment Protocol, will adjust:  Famotidine from BID to Q48h as CrCl is within 10-29 mL/min      Will continue to follow and make adjustments accordingly. Thank You.    Arely La [facility] ....................  1/20/2025   6:21 PM

## 2025-01-21 NOTE — PROGRESS NOTES
Interdisciplinary Discharge Planning Note    Anticipated Discharge Date: 2-3 days    Anticipated Discharge Location: home    Clinical Needs Before Discharge:  stable oxygen requirement, surgery consult    Treatment Needs After Discharge:  None identified at this time    Potential Barriers to Discharge: none identified     Kaylie Martin  1/21/2025,  12:32 PM     SARAH Torres on 1/21/2025 at 12:55 PM

## 2025-01-21 NOTE — PROGRESS NOTES
Preventing Falls in the Hospital (02:42)  Your health professional recommends that you watch this short online health video.  Find out why you're at risk for falling in the hospital and how to prevent falls.   Purpose: Understand what increases a person's risk of falling in the hospital and how to prevent falls.  Goal: Understand what increases a person's risk of falling in the hospital and how to prevent falls.    Watch: Scan the QR code or visit the link to view video       https://hwi.se/r/[email or web address]  Current as of: July 17, 2023  Content Version: 14.2 2024 [facility] [employer].   Care instructions adapted under license by your healthcare professional. If you have questions about a medical condition or this instruction, always ask your healthcare professional. [employer] disclaims any warranty or liability for your use of this information.  Preventing Falls in the Hospital

## 2025-01-21 NOTE — PROGRESS NOTES
GENERAL SURGERY PROGRESS NOTE  1/21/2025      Interval history:   No acute events overnight.  Patient denies significant shortness of breath.  Some discomfort at the tube site.  Denies fevers or chills.    Physical Exam:   Vital signs are reviewed and there are no significant  abnormalities.     UOP over past 24 hours is unrecorded since admission  Chest tube drainage un recorded    General: Laying in bed, appears comfortable  HEENT: Nasal cannula oxygen in place  Lungs: Bilateral breath sounds present.  Chest tube on the right side draining serosanguineous fluid.  Small persistent airleak  CV: Regular rhythm,  Abdomen: Soft, nontender  MSK: Thin extremities  Neuro: Alert and oriented    Labs are reviewed and are significant for sodium 130, potassium 4.2, creatinine 1.06, WBC 4.1, hemoglobin 9.1, platelet 182    CXR:  Small right apical thorax is slightly larger when compared with prior  Study. Subcutaneous emphysema along the right hemithorax.      Assessment / Plan:   Karen Rodriguez is a 79 year old female with iatrogenic right pneumothorax from attempted lung biopsy.  Continue chest tube, placed to waterseal this morning to hopefully facilitate the lung to seal.  Will order follow-up chest x-ray this afternoon to evaluate for change in pneumothorax.  Placed patient back to suction if she experiences shortness of breath or increased oxygen need.    HEMA Marrufo MD   1/21/2025

## 2025-01-21 NOTE — PLAN OF CARE
Goal Outcome Evaluation: Patient is A&Ox4. VSS; BP was soft this morning, but has increased throughout the day.     Patient has chest tube in place d/t pneumothorax. Small air leak, and provider aware. Chest tube is water sealed with 10ml output this shift. Patient on 3L O2 NC with sats at 100%. Patient denies shortness of breath and no increased work of breathing. Bilateral breath sounds. Lung sounds diminished with expiratory wheezes; course crackles on right side. Crepitus around dressing on right scapular area. Patient complains of pain at insertion site; gave PRN oxycodone which provided relief.    Patient used walker to transfer from bed to chair, and up in chair this morning. Purewick was in place but leaked and bad saturated; removed and placed brief. Patient up to commode to urinate 400ml, and then back to bed. Assist of 2 with walker due to oxygen tubing, chest tube, and inability to use gait belt d/t chest tube placement.     Vitals at 1357: /47 (BP Location: Right arm, Patient Position: Semi-Diana's, Cuff Size: Adult Small)   Pulse 57   Temp 97.8  F (36.6  C) (Temporal)   Resp 18   Wt 44.7 kg (98 lb 8 oz)   SpO2 100%   BMI 19.89 kg/m         Plan of Care Reviewed With: patient    Overall Patient Progress: improvingOverall Patient Progress: improving    Outcome Evaluation: VSS. Chest tube water sealed with low output. Patient has pain in right back where chest tube placed. Up to chair today. Urinated at commode.

## 2025-01-21 NOTE — PHARMACY-ADMISSION MEDICATION HISTORY
Pharmacist Admission Medication History    Admission medication history is complete. The information provided in this note is only as accurate as the sources available at the time of the update.    Information Source(s): Patient and CareEverywhere/SureScripts via in-person    Pertinent Information: Patient did not take medications day of admission as she was directed. States tylenol use is infrequent. Attests she would like to use [facility] pharmacy at discharge.    Changes made to PTA medication list:  Added: Vitamin D  Deleted: Magnesium, iron  Changed: None    Allergies reviewed with patient and updates made in EHR: yes    Medication History Completed By: Arely La RPH 1/20/2025 6:23 PM    PTA Med List   Medication Sig Last Dose/Taking    acetaminophen (TYLENOL) 325 MG tablet Take 325 mg by mouth every 4 hours as needed for pain. Past Month    amLODIPine (NORVASC) 5 MG tablet Take 1 tablet by mouth daily. 1/19/2025 Morning    atorvastatin (LIPITOR) 40 MG tablet Take 1 Tablet by mouth with supper. 1/19/2025 Evening    carvedilol (COREG) 25 MG tablet Take 1 tablet by mouth 2 times daily (with meals). 1/19/2025 Evening    famotidine (PEPCID) 20 MG tablet Take 1 tablet by mouth 2 times daily. 1/19/2025 Evening    vitamin C (ASCORBIC ACID) 500 MG tablet Take 1 tablet by mouth daily. 1/19/2025 Morning    Vitamin D, Cholecalciferol, 10 MCG (400 UNIT) TABS Take 1 tablet by mouth daily. 1/19/2025 Morning

## 2025-01-21 NOTE — PLAN OF CARE
Problem: Respiratory Compromise (Pneumothorax)  Goal: Optimal Oxygenation and Ventilation  Outcome: Progressing   Goal Outcome Evaluation:    CT to -20 mmHg suction. No further output this shift. Has small air leak and + crepitus in her right scapular area. Dressing c/d/I.   She c/o pain at insertion site. Oxy and tylenol effective at reducing pain.   VSS.   Purewick placed at HS. Patient has not had voided this shift. Bladder scanned for only 263 mL. Noc hospitalist notified and no new orders received at this time d/t h/o HF, see how AM labs look.

## 2025-01-21 NOTE — PROGRESS NOTES
[facility]    Medicine Progress Note - Hospitalist Service    Date of Admission:  1/20/2025    Assessment & Plan      Karen Rodriguez is a 79 year old woman with a  past medical history of pulmonary emphysema, chronic diastolic heart failure, rheumatoid arthritis, prior stroke, hyperlipidemia, hypertension, prior tobacco use who was admitted to the hospital after a pneumothorax during her stereotactic biopsy of her lung.    Principal Problem:    Pneumothorax    Pulmonary emphysema, unspecified emphysema type (H)    Assessment: Pneumothorax after stereotactic biopsy of the right lower lobe concerning for malignancy.  Has a persistent air leak at this time.    Plan:   -Chest tube to continuous suction   -chest x-ray daily  -Consult general surgery  -As needed acetaminophen, oxycodone and hydromorphone for pain  -Supplemental oxygen for reabsorption    Active Problems:    Chronic diastolic heart failure (H)    Hypertensive heart disease with congestive heart failure (H)    Assessment: Appears euvolemic at this time, not on diuretics prior to admission.        Essential (primary) hypertension    Assessment: PTA on amlodipine 5mg daily, Carvedilol 25mg BID.     Plan:   -hold amlodipine 5mg daily  -hold carvedilol 25mg BID      Rheumatoid arthritis (H)    Assessment: Does not appear to be on any controller medications at this time.    History of Right-sided lacunar infarction (H)    HLD (hyperlipidemia)    Assessment: Prior to admission on atorvastatin 40 mg daily does not appear to be on aspirin.    Plan:   -atorvastatin 40mg daily    Anemia  Assessment: Normocytic with a decline in her hemoglobin from 10.8 on 12- down to 9.1 today.  Could be dilutional versus some blood loss.  Will continue to trend  Plan:   -trend hemoglobin            Diet: Combination Diet Regular Diet Adult    DVT Prophylaxis: Pneumatic Compression Devices  [place]chavarria Catheter: Not present  Lines: None     Cardiac  Monitoring: None  Code Status: Full Code      Clinically Significant Risk Factors Present on Admission          # Hyperchloremia: Highest Cl = 108 mmol/L in last 2 days, will monitor as appropriate      # Hypocalcemia: Lowest Ca = 8.1 mg/dL in last 2 days, will monitor and replace as appropriate         # Hypertension: Noted on problem list      # Anemia: based on hgb <11                  Social Drivers of Health    Tobacco Use: Medium Risk (12/30/2024)    [facility]    Patient History     Smoking Tobacco Use: Former     Smokeless Tobacco Use: Never   Physical Activity: Inactive (6/19/2024)    [facility]    Exercise Vital Sign     Days of Exercise per Week: 0 days     Minutes of Exercise per Session: 0 min   Social Connections: Socially Isolated (6/19/2024)    Received from [facility] [employer] [facility]    Social Connection and Isolation Panel [NHANES]     Frequency of Communication with Friends and Family: Twice a week     Frequency of Social Gatherings with Friends and Family: More than three times a week     Attends Evangelical Services: Never     Active Member of Clubs or Organizations: No     Attends Club or Organization Meetings: Never     Marital Status:           Disposition Plan     Medically Ready for Discharge: Anticipated in 2-4 Days             Eron Rodriguez MD  Hospitalist Service  [facility]  Securely message with [employer] (more info)  Text page via [employer] Paging/Directory   ______________________________________________________________________    Interval History   Still having some aching at the chest tube site.  She states her breathing is comfortable.  On supplemental oxygen for pneumothorax reabsorption.  Discussed with general surgery this morning.  Still appears to have a air leak on my viewing of the atrium    Physical Exam   Vital Signs: Temp: 97.7  F  (36.5  C) Temp src: Temporal BP: (P) 108/42 Pulse: 58   Resp: 16 SpO2: 98 % O2 Device: Nasal cannula Oxygen Delivery: 3 LPM  Weight: 98 lbs 8 oz  General: Pleasant 79-year-old woman lying in bed no acute distress  Pulmonary: Unlabored breathing, clear to auscultation still some subcutaneous emphysema posteriorly atrium with a air leak  CV: Regular rate and rhythm  GI: Soft nontender abdomen    Medical Decision Making             Data     I have personally reviewed the following data over the past 24 hrs:    4.1  \   9.1 (L)   / 182     138 108 (H) 29.4 (H) /  75   4.2 23 1.06 (H) \       Imaging results reviewed over the past 24 hrs:   Recent Results (from the past 24 hours)   XR Chest Port 1 View    Narrative    XR CHEST PORT 1 VIEW    HISTORY: 79 yearsFemale chest tube placement    TECHNIQUE: A single view of the chest was performed    COMPARISON: 11/18/2024    FINDINGS: A right chest tube is been placed. The tip is at the apex.  There is a small amount of air in the right lateral chest wall.    There is a very small right basilar pneumothorax. There is no  significant pneumothorax at this time. There is no mediastinal shift.        Impression    IMPRESSION: Appropriate position of right chest tube. Very small right  pneumothorax.    ADITI ALLEN MD         SYSTEM ID:  [id number]   CT Lung Mediastinum Biopsy    Narrative    CT LUNG MEDIASTINUM BIOPSY    HISTORY: 79 years Female RLL FDG avid lesion; NSCLC of right lung (H)    COMPARISON: None    TECHNIQUE: Informed consent was obtained. A timeout was performed. The  patient was sterilely prepped and draped. Local injections of  lidocaine with a 25-gauge needle were performed.    Immediately after the injection of lidocaine, the biopsy introducer or  was inserted 2.2 cm into the intercostal right chest wall. Imaging was  performed demonstrating presence of a small pneumothorax. The  needle/introducer was immediately withdrawn.    Within one or two minutes,  patient started to complain of right chest  pain which increased in severity. Pain became severe and patient  became noncommunicative. Vital signs remained stable. A limited CT  through the site demonstrated increasing size of the right  pneumothorax.    Rapid response was called.      Impression    Impression: Biopsy was aborted during the procedure before samples  could be obtained. The biopsy needle introducer was advanced into the  chest wall, superficial to the pleural space.. Pneumothorax likely  occurred during injection of lidocaine. A rapid response was called as  the patient's condition deteriorated.    ADITI ALLEN MD         SYSTEM ID:  [id number]   XR Chest Port 1 View    Narrative    Procedure:XR CHEST PORT 1 VIEW    Clinical history:Female, 79 years, follow up pneumothorax    Technique: Single view was obtained.    Comparison: 1/20/2025    Findings: The cardiac silhouette is within normal limits.    The lungs demonstrate a small right apical pneumothorax which is  slightly larger when compared to the study from 1/20/2025 at 1029  hours. Right-sided chest tube remains satisfactorily positioned. Bony  structures are unremarkable.      Impression    Impression:   Small right apical thorax is slightly larger when compared with prior  study.     Subcutaneous emphysema along the right hemithorax.    ACE PURCELL MD         SYSTEM ID:  [id number]

## 2025-01-21 NOTE — PLAN OF CARE
Goal Outcome Evaluation:    Pt admitted from [place] ED. A&O. She had a CT guided biopsy of her lungs and her chest wall was punctured during the procedure. Chest tube placed in [place] ER on the right side, patent, set to suction. Pt remains on an oxy mask for comfort at 1-2L. CO pain 5/10 at the insertion site but refused interventions. Lungs are coarse with crackles through out. Crepitus is noted in her upper back. Has blanchable redness to her spine Allyvn placed. CCR Q2hrs. Purwick in place. SCD's on. All questions and concerns addressed.     /52 (BP Location: Left arm, Patient Position: Semi-Diana's, Cuff Size: Adult Small)   Pulse 52   Temp (!) 96.3  F (35.7  C) (Tympanic)   Resp 20   Wt 43.6 kg (96 lb 1.9 oz)   SpO2 98%   BMI 19.41 kg/m          Plan of Care Reviewed With: patient    Overall Patient Progress: no change    Outcome Evaluation: VSS, Chest tube set to suction, Purwick in place,    Bonita Mcfadden RN on 1/20/2025 at 6:50 PM

## 2025-01-21 NOTE — PROGRESS NOTES
SAFETY CHECKLIST  ID Bands and Risk clasps correct and in place (DNR, Fall risk, Allergy, Latex, Limb):  Yes  All Lines Reconciled and labeled correctly: Yes  Whiteboard updated:Yes  Environmental interventions: Yes  Verify Tele #:  N/A    Patient is now on 3L NC.

## 2025-01-22 ENCOUNTER — APPOINTMENT (OUTPATIENT)
Dept: GENERAL RADIOLOGY | Facility: OTHER | Age: 80
End: 2025-01-22
Attending: STUDENT IN AN ORGANIZED HEALTH CARE EDUCATION/TRAINING PROGRAM
Payer: MEDICARE

## 2025-01-22 LAB
ANION GAP SERPL CALCULATED.3IONS-SCNC: 8 MMOL/L (ref 7–15)
BUN SERPL-MCNC: 24.3 MG/DL (ref 8–23)
CALCIUM SERPL-MCNC: 7.9 MG/DL (ref 8.8–10.4)
CHLORIDE SERPL-SCNC: 105 MMOL/L (ref 98–107)
CREAT SERPL-MCNC: 1.2 MG/DL (ref 0.51–0.95)
EGFRCR SERPLBLD CKD-EPI 2021: 46 ML/MIN/1.73M2
ERYTHROCYTE [DISTWIDTH] IN BLOOD BY AUTOMATED COUNT: 13.5 % (ref 10–15)
GLUCOSE SERPL-MCNC: 79 MG/DL (ref 70–99)
HCO3 SERPL-SCNC: 21 MMOL/L (ref 22–29)
HCT VFR BLD AUTO: 28.5 % (ref 35–47)
HGB BLD-MCNC: 8.8 G/DL (ref 11.7–15.7)
MCH RBC QN AUTO: 30.3 PG (ref 26.5–33)
MCHC RBC AUTO-ENTMCNC: 30.9 G/DL (ref 31.5–36.5)
MCV RBC AUTO: 98 FL (ref 78–100)
PLATELET # BLD AUTO: 161 10E3/UL (ref 150–450)
POTASSIUM SERPL-SCNC: 4.1 MMOL/L (ref 3.4–5.3)
RBC # BLD AUTO: 2.9 10E6/UL (ref 3.8–5.2)
SODIUM SERPL-SCNC: 134 MMOL/L (ref 135–145)
WBC # BLD AUTO: 4.2 10E3/UL (ref 4–11)

## 2025-01-22 PROCEDURE — 99232 SBSQ HOSP IP/OBS MODERATE 35: CPT | Mod: 25 | Performed by: STUDENT IN AN ORGANIZED HEALTH CARE EDUCATION/TRAINING PROGRAM

## 2025-01-22 PROCEDURE — 36415 COLL VENOUS BLD VENIPUNCTURE: CPT | Performed by: STUDENT IN AN ORGANIZED HEALTH CARE EDUCATION/TRAINING PROGRAM

## 2025-01-22 PROCEDURE — 250N000011 HC RX IP 250 OP 636: Performed by: STUDENT IN AN ORGANIZED HEALTH CARE EDUCATION/TRAINING PROGRAM

## 2025-01-22 PROCEDURE — 71045 X-RAY EXAM CHEST 1 VIEW: CPT

## 2025-01-22 PROCEDURE — 85014 HEMATOCRIT: CPT | Performed by: STUDENT IN AN ORGANIZED HEALTH CARE EDUCATION/TRAINING PROGRAM

## 2025-01-22 PROCEDURE — 82310 ASSAY OF CALCIUM: CPT | Performed by: STUDENT IN AN ORGANIZED HEALTH CARE EDUCATION/TRAINING PROGRAM

## 2025-01-22 PROCEDURE — 250N000013 HC RX MED GY IP 250 OP 250 PS 637: Performed by: STUDENT IN AN ORGANIZED HEALTH CARE EDUCATION/TRAINING PROGRAM

## 2025-01-22 PROCEDURE — 999N000157 HC STATISTIC RCP TIME EA 10 MIN

## 2025-01-22 PROCEDURE — 80048 BASIC METABOLIC PNL TOTAL CA: CPT | Performed by: STUDENT IN AN ORGANIZED HEALTH CARE EDUCATION/TRAINING PROGRAM

## 2025-01-22 PROCEDURE — 99231 SBSQ HOSP IP/OBS SF/LOW 25: CPT | Performed by: SURGERY

## 2025-01-22 PROCEDURE — 120N000001 HC R&B MED SURG/OB

## 2025-01-22 RX ORDER — ACETAMINOPHEN 500 MG
1000 TABLET ORAL 4 TIMES DAILY
Status: DISPENSED | OUTPATIENT
Start: 2025-01-22

## 2025-01-22 RX ADMIN — ACETAMINOPHEN 1000 MG: 500 TABLET, FILM COATED ORAL at 12:34

## 2025-01-22 RX ADMIN — ACETAMINOPHEN 650 MG: 325 TABLET, FILM COATED ORAL at 09:01

## 2025-01-22 RX ADMIN — HYDROMORPHONE HYDROCHLORIDE 0.4 MG: 0.2 INJECTION, SOLUTION INTRAMUSCULAR; INTRAVENOUS; SUBCUTANEOUS at 04:05

## 2025-01-22 RX ADMIN — OXYCODONE HYDROCHLORIDE 5 MG: 5 TABLET ORAL at 04:02

## 2025-01-22 RX ADMIN — OXYCODONE HYDROCHLORIDE 5 MG: 5 TABLET ORAL at 21:09

## 2025-01-22 RX ADMIN — OXYCODONE HYDROCHLORIDE 5 MG: 5 TABLET ORAL at 09:01

## 2025-01-22 RX ADMIN — ACETAMINOPHEN 1000 MG: 500 TABLET, FILM COATED ORAL at 21:09

## 2025-01-22 RX ADMIN — ONDANSETRON 4 MG: 2 INJECTION INTRAMUSCULAR; INTRAVENOUS at 15:34

## 2025-01-22 RX ADMIN — ACETAMINOPHEN 1000 MG: 500 TABLET, FILM COATED ORAL at 16:16

## 2025-01-22 RX ADMIN — ATORVASTATIN CALCIUM 80 MG: 40 TABLET, FILM COATED ORAL at 21:09

## 2025-01-22 ASSESSMENT — ACTIVITIES OF DAILY LIVING (ADL)
ADLS_ACUITY_SCORE: 41
ADLS_ACUITY_SCORE: 42
ADLS_ACUITY_SCORE: 42
ADLS_ACUITY_SCORE: 41
ADLS_ACUITY_SCORE: 41
ADLS_ACUITY_SCORE: 42
ADLS_ACUITY_SCORE: 42
ADLS_ACUITY_SCORE: 41
ADLS_ACUITY_SCORE: 42
ADLS_ACUITY_SCORE: 41
ADLS_ACUITY_SCORE: 42
ADLS_ACUITY_SCORE: 41

## 2025-01-22 NOTE — PROGRESS NOTES
Patient on room air. Checked vitals and sats 88%. Put NC back on at 1L and sats now 91%. Notified RT.    2:30pm vitals: /46 (BP Location: Right arm, Patient Position: Semi-Diana's, Cuff Size: Adult Small)   Pulse 69   Temp 98.7  F (37.1  C) (Tympanic)   Resp 18   Wt 48.2 kg (106 lb 3.2 oz)   SpO2 (!) 88%   BMI 21.45 kg/m

## 2025-01-22 NOTE — PROGRESS NOTES
Nursing assistant was assisting pt up to a sitting position in bed.   Pt became light headed and started to dry heave.   Iv Zofran was administered.   Bp and pulse obtained.   Order placed for orthostatic BP check x1  Mena Flores RN on 1/22/2025 at 4:23 PM

## 2025-01-22 NOTE — PROGRESS NOTES
[facility]    Medicine Progress Note - Hospitalist Service    Date of Admission:  1/20/2025    Assessment & Plan      Karen Rodriguez is a 79 year old woman with a  past medical history of pulmonary emphysema, chronic diastolic heart failure, rheumatoid arthritis, prior stroke, hyperlipidemia, hypertension, prior tobacco use who was admitted to the hospital after a pneumothorax during her stereotactic biopsy of her lung.    Principal Problem:    Pneumothorax    Pulmonary emphysema, unspecified emphysema type (H)    Assessment: Pneumothorax after stereotactic biopsy of the right lower lobe concerning for malignancy.  Has a persistent air leak at this time.    Plan:   -Chest tube to Water seal  -chest x-ray daily  -Consult general surgery  -scheduled acetaminophen 1000mg 4 times daily  -As needed  oxycodone and hydromorphone for pain  -Supplemental oxygen for reabsorption  -incentive spirometer  -ambulate    Active Problems:    Chronic diastolic heart failure (H)    Hypertensive heart disease with congestive heart failure (H)    Assessment: Appears euvolemic at this time, not on diuretics prior to admission.        Essential (primary) hypertension    Assessment: PTA on amlodipine 5mg daily, Carvedilol 25mg BID.     Plan:   -hold amlodipine 5mg daily  -hold carvedilol 25mg BID      Rheumatoid arthritis (H)    Assessment: Does not appear to be on any controller medications at this time.    History of Right-sided lacunar infarction (H)    HLD (hyperlipidemia)    Assessment: Prior to admission on atorvastatin 40 mg daily does not appear to be on aspirin.    Plan:   -atorvastatin 40mg daily    Anemia  Assessment: Normocytic with a decline in her hemoglobin from 10.8 on 12- down to 9.1 today.  Could be dilutional versus some blood loss.  Will continue to trend  Plan:   -trend hemoglobin            Diet: Combination Diet Regular Diet Adult    DVT Prophylaxis: Pneumatic Compression Devices  [place]  Catheter: Not present  Lines: None     Cardiac Monitoring: None  Code Status: Full Code      Clinically Significant Risk Factors         # Hyponatremia: Lowest Na = 134 mmol/L in last 2 days, will monitor as appropriate  # Hyperchloremia: Highest Cl = 108 mmol/L in last 2 days, will monitor as appropriate              # Hypertension: Noted on problem list                         Social Drivers of Health    Tobacco Use: Medium Risk (12/30/2024)    [facility]    Patient History     Smoking Tobacco Use: Former     Smokeless Tobacco Use: Never   Physical Activity: Inactive (6/19/2024)    [facility]    Exercise Vital Sign     Days of Exercise per Week: 0 days     Minutes of Exercise per Session: 0 min   Social Connections: Socially Isolated (6/19/2024)    Received from [facility]    Social Connection and Isolation Panel [NHANES]     Frequency of Communication with Friends and Family: Twice a week     Frequency of Social Gatherings with Friends and Family: More than three times a week     Attends Nondenominational Services: Never     Active Member of Clubs or Organizations: No     Attends Club or Organization Meetings: Never     Marital Status:           Disposition Plan     Medically Ready for Discharge: Anticipated in 2-4 Days             Eron Rodriguez MD  Hospitalist Service  [facility]  Securely message with [employer] (more info)  Text page via [employer] Paging/Directory   ______________________________________________________________________    Interval History   Still having some aching at the chest tube site.  She states her breathing is comfortable.  Discussed with general surgery today.  Still has an air leak.  Discussed the prolonged expected course.  She is still water-sealed.  Encouraged her to get up and ambulate and utilize an incentive spirometer.    Physical  Exam   Vital Signs: Temp: 98.7  F (37.1  C) Temp src: Tympanic BP: 120/46 Pulse: 69   Resp: 18 SpO2: (!) 91 % O2 Device: Nasal cannula Oxygen Delivery: 1 LPM  Weight: 106 lbs 3.2 oz  General: Pleasant 79-year-old woman lying in bed no acute distress  Pulmonary: Unlabored breathing, clear to auscultation still some subcutaneous emphysema posteriorly atrium with a air leak  CV: Regular rate and rhythm  GI: Soft nontender abdomen    Medical Decision Making             Data     I have personally reviewed the following data over the past 24 hrs:    4.2  \   8.8 (L)   / 161     134 (L) 105 24.3 (H) /  79   4.1 21 (L) 1.20 (H) \       Imaging results reviewed over the past 24 hrs:   Recent Results (from the past 24 hours)   XR Chest Port 1 View    Narrative    EXAM: XR CHEST PORT 1 VIEW  LOCATION: [facility]  DATE: 1/22/2025    INDICATION: follow up pneumothorax  COMPARISON: 8/21/2025.    FINDINGS: Right chest tube remains in place. Trace residual pneumothorax. The heart size is normal. The thoracic aorta is calcified. Mild atelectasis and scarring at the lung bases. Hiatal hernia. Bilateral shoulder arthroplasties. Subcutaneous emphysema   in the right chest wall.      Impression    IMPRESSION: Trace residual right pneumothorax.

## 2025-01-22 NOTE — PROGRESS NOTES
Interdisciplinary Discharge Planning Note    Anticipated Discharge Date: multiple days    Anticipated Discharge Location: home     Clinical Needs Before Discharge:  stable oxygen requirement, pain control    Treatment Needs After Discharge:  None identified at this time    Potential Barriers to Discharge: none identified     Kaylie Martin  1/22/2025,  12:42 PM     SARAH Torres on 1/22/2025 at 12:52 PM

## 2025-01-22 NOTE — PLAN OF CARE
"Goal Outcome Evaluation: Patient is A&Ox4 and pleasant. VSS with exception of low-grade fever. Gave PRN Tylenol. Patient reports pain 7/10 in right back, where chest tube is inserted. Gave PRN oxycodone. Chest tube still has air leak and total of 41ml output, up 5ml from yesterday; water sealed.    Patient up to commode and urinated appropriate output; no ouput in purewick overnight. Patient states \"I can't get myself to pee in bed.\" Discontinued purewick use for the day. Will get up to commode.    /72 (BP Location: Left arm, Patient Position: Semi-Diana's, Cuff Size: Adult Small)   Pulse 71   Temp 100.1  F (37.8  C) (Tympanic)   Resp 18   Wt 48.2 kg (106 lb 3.2 oz)   SpO2 98%   BMI 21.45 kg/m         Plan of Care Reviewed With: patient    Overall Patient Progress: improvingOverall Patient Progress: improving    Outcome Evaluation: VSS except for low grade fever. Chest tube water sealed with low output. Patient's pain increased compared to yesterday. Controlled with PRN pain meds. Urinated in commode.      "

## 2025-01-22 NOTE — PROGRESS NOTES
01/22/25 1201   Tech Time   $Tech Time (10 minute increments) 1   Quick Adds   Quick Adds Incentive Spirometry   Incentive Spirometer (IS)   Incentive Spirometer Predicted Level (mL) 1350   Administration (IS) instruction provided, initial   Number of Repetitions (IS) 10   Level Incentive Spirometer (mL) 700   Patient Tolerance (IS) poor     Incentive Spirometry education completed.  Pt goal 1350 mls.  Pt achieved 700 mls.  Pt instructed to perform 10/hr while awake with at least one deep breath and cough per hour until able to perform baseline activity.  How to use an Incentive Spirometer written instructions provided to patient. RT will follow and re-assess as need. Poor effort will continue to follow-up      RT Sin on 1/22/2025 at 12:05 PM

## 2025-01-22 NOTE — PLAN OF CARE
Goal Outcome Evaluation:  Alert and oriented. VSS. Patient has chest tube, small air leak, provider aware. Chest tube is water sealed. Set to -20mmHg suction. 5mL out this shift. Patient on 3 LPM NC. Pain managed with PRN oxycodone and dilaudid.       Plan of Care Reviewed With: patient    Overall Patient Progress: improvingOverall Patient Progress: improving     Blood pressure 106/43, pulse 69, temperature 100.1  F (37.8  C), temperature source Tympanic, resp. rate 18, weight 44.7 kg (98 lb 8 oz), SpO2 99%.    Becky Kamara RN on 1/21/2025 at 11:30 PM

## 2025-01-22 NOTE — PROGRESS NOTES
SAFETY CHECKLIST  ID Bands and Risk clasps correct and in place (DNR, Fall risk, Allergy, Latex, Limb):  Yes  All Lines Reconciled and labeled correctly: Yes  Whiteboard updated:Yes  Environmental interventions: Yes  Verify Tele #:  AASHISH Kamara RN on 1/21/2025 at 7:19 PM

## 2025-01-22 NOTE — PROGRESS NOTES
GENERAL SURGERY PROGRESS NOTE  1/22/2025      Interval history:   No acute events overnight.  Patient denies significant shortness of breath. More pain today.  Denies fevers or chills.    Physical Exam:   Vital signs are reviewed and there are no significant  abnormalities.     UOP over past 24 hours 600mL  Chest tube 15 mL    General: Laying in bed, mild distress   HEENT: Nasal cannula oxygen in place  Lungs: Bilateral breath sounds present.  Chest tube on the right side draining serosanguineous fluid.  Small persistent airleak  CV: Regular rhythm,  Abdomen: Soft, nontender  MSK: Thin extremities  Neuro: Alert and oriented    Labs are reviewed and are significant for sodium 134, potassium 4.1, creatinine 1.20, WBC 4.2, hemoglobin 8.8, platelet 161    CXR:  Trace residual right pneumothorax.       Assessment / Plan:   Karen Rodriguez is a 79 year old female with iatrogenic right pneumothorax from attempted lung biopsy.      Continue water seal  Daily CXR    HEMA Marrufo MD   1/22/2025

## 2025-01-22 NOTE — PROVIDER NOTIFICATION
Took patient's orthostatic blood pressures before getting out of bed and then 15 minutes later after ambulating to bathroom, sitting on commode, and ambulating around room. Results below. First column is before getting out of bed. Second column is after ambulatin25 1616 25 1633   Lying Orthostatic BP   Lying Orthostatic /45 132/46   Lying Orthostatic Pulse 61 bpm 49 bpm   Sitting Orthostatic BP   Sitting Orthostatic /48 111/55   Sitting Orthostatic Pulse 79 bpm 67 bpm   Standing Orthostatic BP   Standing Orthostatic /45  (before ambulating) 102/49  (after ambulating)   Standing Orthostatic Pulse 85 bpm 85 bpm     Reports nausea with movement and patient burping after ambulating 10 feet. Patient reports hands are going numb, and they are cold to touch. Patient's oxygen on 1L and sats down to 85% after ambulating. Was 95% before ambulating. Back to 98% after resting in bed on 3L and now back to 1L. Provider and RT notified.    Amelia Azevedo RN on 2025 at 4:29 PM

## 2025-01-23 VITALS
HEART RATE: 59 BPM | OXYGEN SATURATION: 97 % | TEMPERATURE: 98.4 F | RESPIRATION RATE: 16 BRPM | BODY MASS INDEX: 20.54 KG/M2 | DIASTOLIC BLOOD PRESSURE: 49 MMHG | WEIGHT: 101.7 LBS | SYSTOLIC BLOOD PRESSURE: 115 MMHG

## 2025-01-23 LAB
ANION GAP SERPL CALCULATED.3IONS-SCNC: 6 MMOL/L (ref 7–15)
BUN SERPL-MCNC: 26.6 MG/DL (ref 8–23)
CALCIUM SERPL-MCNC: 7.9 MG/DL (ref 8.8–10.4)
CHLORIDE SERPL-SCNC: 106 MMOL/L (ref 98–107)
CREAT SERPL-MCNC: 1.35 MG/DL (ref 0.51–0.95)
EGFRCR SERPLBLD CKD-EPI 2021: 40 ML/MIN/1.73M2
ERYTHROCYTE [DISTWIDTH] IN BLOOD BY AUTOMATED COUNT: 13.6 % (ref 10–15)
GLUCOSE SERPL-MCNC: 70 MG/DL (ref 70–99)
HCO3 SERPL-SCNC: 24 MMOL/L (ref 22–29)
HCT VFR BLD AUTO: 27.3 % (ref 35–47)
HGB BLD-MCNC: 8.5 G/DL (ref 11.7–15.7)
MCH RBC QN AUTO: 30.8 PG (ref 26.5–33)
MCHC RBC AUTO-ENTMCNC: 31.1 G/DL (ref 31.5–36.5)
MCV RBC AUTO: 99 FL (ref 78–100)
PLATELET # BLD AUTO: 173 10E3/UL (ref 150–450)
POTASSIUM SERPL-SCNC: 4.1 MMOL/L (ref 3.4–5.3)
RBC # BLD AUTO: 2.76 10E6/UL (ref 3.8–5.2)
SODIUM SERPL-SCNC: 136 MMOL/L (ref 135–145)
WBC # BLD AUTO: 4.1 10E3/UL (ref 4–11)

## 2025-01-23 PROCEDURE — 80048 BASIC METABOLIC PNL TOTAL CA: CPT | Performed by: STUDENT IN AN ORGANIZED HEALTH CARE EDUCATION/TRAINING PROGRAM

## 2025-01-23 PROCEDURE — 99232 SBSQ HOSP IP/OBS MODERATE 35: CPT | Mod: 25 | Performed by: STUDENT IN AN ORGANIZED HEALTH CARE EDUCATION/TRAINING PROGRAM

## 2025-01-23 PROCEDURE — 120N000001 HC R&B MED SURG/OB

## 2025-01-23 PROCEDURE — 250N000013 HC RX MED GY IP 250 OP 250 PS 637: Performed by: STUDENT IN AN ORGANIZED HEALTH CARE EDUCATION/TRAINING PROGRAM

## 2025-01-23 PROCEDURE — 250N000011 HC RX IP 250 OP 636: Performed by: STUDENT IN AN ORGANIZED HEALTH CARE EDUCATION/TRAINING PROGRAM

## 2025-01-23 PROCEDURE — 82374 ASSAY BLOOD CARBON DIOXIDE: CPT | Performed by: STUDENT IN AN ORGANIZED HEALTH CARE EDUCATION/TRAINING PROGRAM

## 2025-01-23 PROCEDURE — 250N000013 HC RX MED GY IP 250 OP 250 PS 637: Performed by: INTERNAL MEDICINE

## 2025-01-23 PROCEDURE — 85018 HEMOGLOBIN: CPT | Performed by: STUDENT IN AN ORGANIZED HEALTH CARE EDUCATION/TRAINING PROGRAM

## 2025-01-23 PROCEDURE — 82310 ASSAY OF CALCIUM: CPT | Performed by: STUDENT IN AN ORGANIZED HEALTH CARE EDUCATION/TRAINING PROGRAM

## 2025-01-23 PROCEDURE — 85041 AUTOMATED RBC COUNT: CPT | Performed by: STUDENT IN AN ORGANIZED HEALTH CARE EDUCATION/TRAINING PROGRAM

## 2025-01-23 PROCEDURE — 99231 SBSQ HOSP IP/OBS SF/LOW 25: CPT | Performed by: SURGERY

## 2025-01-23 PROCEDURE — 36415 COLL VENOUS BLD VENIPUNCTURE: CPT | Performed by: STUDENT IN AN ORGANIZED HEALTH CARE EDUCATION/TRAINING PROGRAM

## 2025-01-23 RX ORDER — SUCRALFATE ORAL 1 G/10ML
1 SUSPENSION ORAL
Status: DISPENSED | OUTPATIENT
Start: 2025-01-23

## 2025-01-23 RX ADMIN — OXYCODONE HYDROCHLORIDE 2.5 MG: 5 TABLET ORAL at 11:29

## 2025-01-23 RX ADMIN — POLYETHYLENE GLYCOL 3350 17 G: 17 POWDER, FOR SOLUTION ORAL at 11:30

## 2025-01-23 RX ADMIN — SUCRALFATE ORAL 1 G: 1 SUSPENSION ORAL at 17:32

## 2025-01-23 RX ADMIN — OXYCODONE HYDROCHLORIDE 5 MG: 5 TABLET ORAL at 21:21

## 2025-01-23 RX ADMIN — ACETAMINOPHEN 1000 MG: 500 TABLET, FILM COATED ORAL at 11:54

## 2025-01-23 RX ADMIN — SUCRALFATE ORAL 1 G: 1 SUSPENSION ORAL at 21:21

## 2025-01-23 RX ADMIN — FAMOTIDINE 20 MG: 20 TABLET, FILM COATED ORAL at 09:01

## 2025-01-23 RX ADMIN — ACETAMINOPHEN 1000 MG: 500 TABLET, FILM COATED ORAL at 21:21

## 2025-01-23 RX ADMIN — ATORVASTATIN CALCIUM 80 MG: 40 TABLET, FILM COATED ORAL at 21:21

## 2025-01-23 RX ADMIN — ONDANSETRON 4 MG: 2 INJECTION INTRAMUSCULAR; INTRAVENOUS at 14:36

## 2025-01-23 RX ADMIN — OXYCODONE HYDROCHLORIDE 5 MG: 5 TABLET ORAL at 02:15

## 2025-01-23 RX ADMIN — ACETAMINOPHEN 1000 MG: 500 TABLET, FILM COATED ORAL at 09:02

## 2025-01-23 RX ADMIN — ACETAMINOPHEN 1000 MG: 500 TABLET, FILM COATED ORAL at 16:44

## 2025-01-23 ASSESSMENT — ACTIVITIES OF DAILY LIVING (ADL)
ADLS_ACUITY_SCORE: 41
ADLS_ACUITY_SCORE: 40
ADLS_ACUITY_SCORE: 41
ADLS_ACUITY_SCORE: 42
ADLS_ACUITY_SCORE: 41
ADLS_ACUITY_SCORE: 41
ADLS_ACUITY_SCORE: 42
ADLS_ACUITY_SCORE: 41
ADLS_ACUITY_SCORE: 41
ADLS_ACUITY_SCORE: 42
ADLS_ACUITY_SCORE: 41
ADLS_ACUITY_SCORE: 40
ADLS_ACUITY_SCORE: 42
ADLS_ACUITY_SCORE: 42
ADLS_ACUITY_SCORE: 41
ADLS_ACUITY_SCORE: 42
ADLS_ACUITY_SCORE: 41
ADLS_ACUITY_SCORE: 41

## 2025-01-23 NOTE — PROGRESS NOTES
[facility]    Medicine Progress Note - Hospitalist Service    Date of Admission:  1/20/2025    Assessment & Plan      Karen Rodriguez is a 79 year old woman with a  past medical history of pulmonary emphysema, chronic diastolic heart failure, rheumatoid arthritis, prior stroke, hyperlipidemia, hypertension, prior tobacco use who was admitted to the hospital after a pneumothorax during her stereotactic biopsy of her lung. She unfortunately has had a persistent air leak.     Principal Problem:    Pneumothorax    Pulmonary emphysema, unspecified emphysema type (H)    Assessment: Pneumothorax after stereotactic biopsy of the right lower lobe concerning for malignancy.  Has a persistent air leak at this time.    Plan:   -Chest tube to Water seal  -chest x-ray daily  -General surgery following  -scheduled acetaminophen 1000mg 4 times daily  -As needed  oxycodone and hydromorphone for pain  -Supplemental oxygen for reabsorption  -incentive spirometer  -ambulate    Active Problems:    Chronic diastolic heart failure (H)    Hypertensive heart disease with congestive heart failure (H)  CKD stage 3b    Assessment: Appears euvolemic at this time, not on diuretics prior to admission.        Essential (primary) hypertension    Assessment: PTA on amlodipine 5mg daily, Carvedilol 25mg BID.     Plan:   -hold amlodipine 5mg daily  -hold carvedilol 25mg BID      Rheumatoid arthritis (H)    Assessment: Does not appear to be on any controller medications at this time.    History of Right-sided lacunar infarction (H)    HLD (hyperlipidemia)    Assessment: Prior to admission on atorvastatin 40 mg daily does not appear to be on aspirin.    Plan:   -atorvastatin 40mg daily    Anemia  Assessment: Normocytic with a decline in her hemoglobin from 10.8 on 12- down 8.5-9. Minimal output from her chest tube.  Could be dilutional versus some blood loss.  Will continue to trend  Plan:   -trend hemoglobin             Diet: Combination Diet Regular Diet Adult    DVT Prophylaxis: Pneumatic Compression Devices  Echavarria Catheter: Not present  Lines: None     Cardiac Monitoring: None  Code Status: Full Code      Clinically Significant Risk Factors         # Hyponatremia: Lowest Na = 134 mmol/L in last 2 days, will monitor as appropriate           # Hypertension: Noted on problem list                         Social Drivers of Health    Tobacco Use: Medium Risk (12/30/2024)    [email or web address]    Patient History     Smoking Tobacco Use: Former     Smokeless Tobacco Use: Never   Physical Activity: Inactive (6/19/2024)    Received from [facility]    Exercise Vital Sign     Days of Exercise per Week: 0 days     Minutes of Exercise per Session: 0 min   Social Connections: Socially Isolated (6/19/2024)    Received from [facility]    Social Connection and Isolation Panel [NHANES]     Frequency of Communication with Friends and Family: Twice a week     Frequency of Social Gatherings with Friends and Family: More than three times a week     Attends Rastafarian Services: Never     Active Member of Clubs or Organizations: No     Attends Club or Organization Meetings: Never     Marital Status:           Disposition Plan     Medically Ready for Discharge: Anticipated in 2-4 Days             Eron Rodriguez MD  Hospitalist Service  [facility]  Securely message with [employer] (more info)  Text page via [employer] Paging/Directory   ______________________________________________________________________    Interval History   Pain is more controlled.  She has been up and ambulating with nursing staff and a walker.  She still has a air leak but it is definitely less prevalent from yesterday.    Physical Exam   Vital Signs: Temp: 97.5  F (36.4  C) Temp src: Tympanic BP: 114/52 Pulse: 67   Resp: 16 SpO2: 95 % O2 Device:  Nasal cannula Oxygen Delivery: 1 LPM  Weight: 101 lbs 11.2 oz  General: Pleasant 79-year-old woman lying in bed no acute distress  Pulmonary: Unlabored breathing, clear to auscultation still some subcutaneous emphysema posteriorly atrium with an air leak  CV: Regular rate and rhythm  GI: Soft nontender abdomen    Medical Decision Making             Data     I have personally reviewed the following data over the past 24 hrs:    4.1  \   8.5 (L)   / 173     136 106 26.6 (H) /  70   4.1 24 1.35 (H) \       Imaging results reviewed over the past 24 hrs:   No results found for this or any previous visit (from the past 24 hours).

## 2025-01-23 NOTE — PLAN OF CARE
Goal Outcome Evaluation: Patient oriented and drowsy but easily aroused and alert. VSS with exception of soft BP, likely baseline. Pt on 1L oxygen with sats at 96%. Chest tube output 4ml today. Leak continues. Patient's lung sounds diminished with wheezing.    Pain controlled with scheduled tylenol and PRN oxycodone. Patient sleeps much of the day. Ambulated to bathroom and around room x2 this AM and once in hallway this PM. Low urine output but also low intake. Got nauseated in hallway and was burping and dry heaving. Gave zofran and brought back to bed. Oxygen and pulse stable while ambulating. Talked to daughter who said pt takes sucralfate 1g; Dr. Herrera placed orders for pt to have this 4 times a day, before meals and before bed for her stomach.      /46 (BP Location: Left arm)   Pulse 60   Temp 97.4  F (36.3  C) (Tympanic)   Resp 16   Wt 46.1 kg (101 lb 11.2 oz)   SpO2 96%   BMI 20.54 kg/m          Plan of Care Reviewed With: patient    Overall Patient Progress: no changeOverall Patient Progress: no change    Outcome Evaluation: VSS. Chest tube water sealed with leak. Patient pain managed with scheduled Tylenol and PRN oxycodone. Ambulated to bathroom x2 this AM. Attempted to ambulate in hallway but got nauseated with dry heaves and burping. Pulse and O2 stable.

## 2025-01-23 NOTE — PLAN OF CARE
Goal Outcome Evaluation:      Plan of Care Reviewed With: patient    Overall Patient Progress: no changeOverall Patient Progress: no change      Problem: Adult Inpatient Plan of Care  Goal: Plan of Care Review  Outcome: Progressing  Flowsheets (Taken 1/23/2025 0519)  Plan of Care Reviewed With: patient  Overall Patient Progress: no change  Goal: Patient-Specific Goal (Individualized)  Outcome: Progressing  Goal: Absence of Hospital-Acquired Illness or Injury  Outcome: Progressing  Intervention: Prevent Skin Injury  Recent Flowsheet Documentation  Taken 1/22/2025 2105 by Haydee Alston RN  Body Position: position changed independently  Goal: Optimal Comfort and Wellbeing  Outcome: Progressing  Intervention: Monitor Pain and Promote Comfort  Recent Flowsheet Documentation  Taken 1/22/2025 2105 by Haydee Alston RN  Pain Management Interventions: medication (see MAR)  Intervention: Provide Person-Centered Care  Recent Flowsheet Documentation  Taken 1/22/2025 2340 by Haydee Alston RN  Trust Relationship/Rapport:   care explained   choices provided   emotional support provided   empathic listening provided  Goal: Readiness for Transition of Care  Outcome: Progressing     Problem: Skin Injury Risk Increased  Goal: Skin Health and Integrity  Outcome: Progressing  Intervention: Optimize Skin Protection  Recent Flowsheet Documentation  Taken 1/22/2025 2340 by Haydee Alston RN  Activity Management: activity adjusted per tolerance  Taken 1/22/2025 2105 by Haydee Alston RN  Activity Management:   activity adjusted per tolerance   ambulated in room   ambulated to bathroom   back to bed     Problem: Respiratory Compromise (Pneumothorax)  Goal: Optimal Oxygenation and Ventilation  Outcome: Progressing

## 2025-01-23 NOTE — PROGRESS NOTES
GENERAL SURGERY PROGRESS NOTE  1/23/2025      Interval history:   No acute events overnight.  Patient denies significant shortness of breath. Ongoing mild/moderate pain from tube site.  Denies fevers or chills.    Physical Exam:   Vital signs are reviewed and there are no significant  abnormalities.     UOP over past 24 hours 600mL  Chest tube 15 mL    General: Laying in bed, mild distress   HEENT: Nasal cannula oxygen in place  Lungs: Bilateral breath sounds present.  Chest tube on the right side draining serosanguineous fluid.  Small persistent airleak  CV: Regular rhythm,  Abdomen: Soft, nontender  MSK: Thin extremities  Neuro: Alert and oriented    Labs are reviewed and are significant for sodium 136, potassium 4.1, creatinine 1.35, WBC 4.1, hemoglobin 8.5, platelet 173          Assessment / Plan:   Karen Rodriguez is a 79 year old female with iatrogenic right pneumothorax from attempted lung biopsy, s/p chest tube placement.     Continue water seal      HEMA Marrufo MD   1/23/2025

## 2025-01-23 NOTE — PLAN OF CARE
Goal Outcome Evaluation: Patient A&Ox4. On 1L oxygen. Sats decrease with movement. Slight orthostatic hypotension. Patient reports pain. Gave scheduled Tylenol.     Chest tube still has air leak. No output today. Water sealed.    Patient ambulates well with walker and gait belt. Ambulated to toilet with success. No purewick today.      Plan of Care Reviewed With: patient    Overall Patient Progress: improvingOverall Patient Progress: improving    Outcome Evaluation: VSS except low grade fever. Chest tube water sealed. Patient pain increased. Controlled with scheduled Tylenol and PRN pain meds. Used walker to ambulate to bathroom and around room. Patient reported numbness in hands with ambulation and oxygen saturation decreased.  Outcome: Progressing  Flowsheets (Taken 1/22/2025 0906)  Individualized Care Needs: Pain control. Chest tube management. Monitor skin on back due to patient's small size.  Anxieties, Fears or Concerns: None stated  Patient/Family-Specific Goals (Include Timeframe): Treat pneumothorax. Manage pain. Maintain VSS.

## 2025-01-24 ENCOUNTER — APPOINTMENT (OUTPATIENT)
Dept: GENERAL RADIOLOGY | Facility: OTHER | Age: 80
End: 2025-01-24
Attending: STUDENT IN AN ORGANIZED HEALTH CARE EDUCATION/TRAINING PROGRAM
Payer: MEDICARE

## 2025-01-24 ENCOUNTER — APPOINTMENT (OUTPATIENT)
Dept: GENERAL RADIOLOGY | Facility: OTHER | Age: 80
DRG: 199 | End: 2025-01-24
Attending: HOSPITALIST
Payer: MEDICARE

## 2025-01-24 LAB
ALBUMIN SERPL BCG-MCNC: 2.6 G/DL (ref 3.5–5.2)
ALP SERPL-CCNC: 200 U/L (ref 40–150)
ALT SERPL W P-5'-P-CCNC: 75 U/L (ref 0–50)
ANION GAP SERPL CALCULATED.3IONS-SCNC: 6 MMOL/L (ref 7–15)
ANION GAP SERPL CALCULATED.3IONS-SCNC: 9 MMOL/L (ref 7–15)
AST SERPL W P-5'-P-CCNC: 91 U/L (ref 0–45)
BILIRUB SERPL-MCNC: 0.3 MG/DL
BUN SERPL-MCNC: 23.5 MG/DL (ref 8–23)
BUN SERPL-MCNC: 24.6 MG/DL (ref 8–23)
CALCIUM SERPL-MCNC: 8 MG/DL (ref 8.8–10.4)
CALCIUM SERPL-MCNC: 8.1 MG/DL (ref 8.8–10.4)
CHLORIDE SERPL-SCNC: 103 MMOL/L (ref 98–107)
CHLORIDE SERPL-SCNC: 104 MMOL/L (ref 98–107)
CREAT SERPL-MCNC: 0.97 MG/DL (ref 0.51–0.95)
CREAT SERPL-MCNC: 1.25 MG/DL (ref 0.51–0.95)
EGFRCR SERPLBLD CKD-EPI 2021: 43 ML/MIN/1.73M2
EGFRCR SERPLBLD CKD-EPI 2021: 59 ML/MIN/1.73M2
ERYTHROCYTE [DISTWIDTH] IN BLOOD BY AUTOMATED COUNT: 13.5 % (ref 10–15)
GLUCOSE SERPL-MCNC: 115 MG/DL (ref 70–99)
GLUCOSE SERPL-MCNC: 84 MG/DL (ref 70–99)
HCO3 SERPL-SCNC: 20 MMOL/L (ref 22–29)
HCO3 SERPL-SCNC: 24 MMOL/L (ref 22–29)
HCT VFR BLD AUTO: 27.9 % (ref 35–47)
HGB BLD-MCNC: 8.9 G/DL (ref 11.7–15.7)
MCH RBC QN AUTO: 31 PG (ref 26.5–33)
MCHC RBC AUTO-ENTMCNC: 31.9 G/DL (ref 31.5–36.5)
MCV RBC AUTO: 97 FL (ref 78–100)
PLATELET # BLD AUTO: 184 10E3/UL (ref 150–450)
POTASSIUM SERPL-SCNC: 3.7 MMOL/L (ref 3.4–5.3)
POTASSIUM SERPL-SCNC: 3.9 MMOL/L (ref 3.4–5.3)
PROT SERPL-MCNC: 6.6 G/DL (ref 6.4–8.3)
RBC # BLD AUTO: 2.87 10E6/UL (ref 3.8–5.2)
SODIUM SERPL-SCNC: 132 MMOL/L (ref 135–145)
SODIUM SERPL-SCNC: 134 MMOL/L (ref 135–145)
TROPONIN T SERPL HS-MCNC: 15 NG/L
WBC # BLD AUTO: 3.8 10E3/UL (ref 4–11)

## 2025-01-24 PROCEDURE — 999N000157 HC STATISTIC RCP TIME EA 10 MIN

## 2025-01-24 PROCEDURE — 258N000003 HC RX IP 258 OP 636: Performed by: HOSPITALIST

## 2025-01-24 PROCEDURE — 85041 AUTOMATED RBC COUNT: CPT | Performed by: STUDENT IN AN ORGANIZED HEALTH CARE EDUCATION/TRAINING PROGRAM

## 2025-01-24 PROCEDURE — 71045 X-RAY EXAM CHEST 1 VIEW: CPT

## 2025-01-24 PROCEDURE — 250N000011 HC RX IP 250 OP 636: Performed by: STUDENT IN AN ORGANIZED HEALTH CARE EDUCATION/TRAINING PROGRAM

## 2025-01-24 PROCEDURE — 80048 BASIC METABOLIC PNL TOTAL CA: CPT | Performed by: STUDENT IN AN ORGANIZED HEALTH CARE EDUCATION/TRAINING PROGRAM

## 2025-01-24 PROCEDURE — 250N000013 HC RX MED GY IP 250 OP 250 PS 637: Performed by: STUDENT IN AN ORGANIZED HEALTH CARE EDUCATION/TRAINING PROGRAM

## 2025-01-24 PROCEDURE — 36415 COLL VENOUS BLD VENIPUNCTURE: CPT | Performed by: STUDENT IN AN ORGANIZED HEALTH CARE EDUCATION/TRAINING PROGRAM

## 2025-01-24 PROCEDURE — 99232 SBSQ HOSP IP/OBS MODERATE 35: CPT | Performed by: STUDENT IN AN ORGANIZED HEALTH CARE EDUCATION/TRAINING PROGRAM

## 2025-01-24 PROCEDURE — 84295 ASSAY OF SERUM SODIUM: CPT | Performed by: HOSPITALIST

## 2025-01-24 PROCEDURE — 36415 COLL VENOUS BLD VENIPUNCTURE: CPT | Performed by: HOSPITALIST

## 2025-01-24 PROCEDURE — 93010 ELECTROCARDIOGRAM REPORT: CPT | Performed by: INTERNAL MEDICINE

## 2025-01-24 PROCEDURE — 250N000013 HC RX MED GY IP 250 OP 250 PS 637: Performed by: INTERNAL MEDICINE

## 2025-01-24 PROCEDURE — 120N000001 HC R&B MED SURG/OB

## 2025-01-24 PROCEDURE — 71045 X-RAY EXAM CHEST 1 VIEW: CPT | Mod: 77

## 2025-01-24 PROCEDURE — 84484 ASSAY OF TROPONIN QUANT: CPT | Performed by: HOSPITALIST

## 2025-01-24 PROCEDURE — 93005 ELECTROCARDIOGRAM TRACING: CPT

## 2025-01-24 PROCEDURE — 250N000013 HC RX MED GY IP 250 OP 250 PS 637: Performed by: HOSPITALIST

## 2025-01-24 PROCEDURE — 85014 HEMATOCRIT: CPT | Performed by: STUDENT IN AN ORGANIZED HEALTH CARE EDUCATION/TRAINING PROGRAM

## 2025-01-24 RX ORDER — MIDODRINE HYDROCHLORIDE 10 MG/1
10 TABLET ORAL ONCE
Status: DISCONTINUED | OUTPATIENT
Start: 2025-01-24 | End: 2025-01-27 | Stop reason: HOSPADM

## 2025-01-24 RX ORDER — NITROGLYCERIN 0.4 MG/1
0.4 TABLET SUBLINGUAL EVERY 5 MIN PRN
Status: DISCONTINUED | OUTPATIENT
Start: 2025-01-24 | End: 2025-01-27 | Stop reason: HOSPADM

## 2025-01-24 RX ADMIN — ACETAMINOPHEN 1000 MG: 500 TABLET, FILM COATED ORAL at 17:00

## 2025-01-24 RX ADMIN — ACETAMINOPHEN 1000 MG: 500 TABLET, FILM COATED ORAL at 09:10

## 2025-01-24 RX ADMIN — SUCRALFATE ORAL 1 G: 1 SUSPENSION ORAL at 08:32

## 2025-01-24 RX ADMIN — ACETAMINOPHEN 1000 MG: 500 TABLET, FILM COATED ORAL at 12:55

## 2025-01-24 RX ADMIN — NITROGLYCERIN 0.4 MG: 0.4 TABLET SUBLINGUAL at 23:04

## 2025-01-24 RX ADMIN — SODIUM CHLORIDE 250 ML: 9 INJECTION, SOLUTION INTRAVENOUS at 23:36

## 2025-01-24 RX ADMIN — ATORVASTATIN CALCIUM 80 MG: 40 TABLET, FILM COATED ORAL at 21:37

## 2025-01-24 RX ADMIN — SUCRALFATE ORAL 1 G: 1 SUSPENSION ORAL at 12:38

## 2025-01-24 RX ADMIN — OXYCODONE HYDROCHLORIDE 2.5 MG: 5 TABLET ORAL at 09:37

## 2025-01-24 RX ADMIN — ONDANSETRON 4 MG: 2 INJECTION INTRAMUSCULAR; INTRAVENOUS at 16:10

## 2025-01-24 RX ADMIN — SUCRALFATE ORAL 1 G: 1 SUSPENSION ORAL at 21:36

## 2025-01-24 RX ADMIN — OXYCODONE HYDROCHLORIDE 2.5 MG: 5 TABLET ORAL at 22:02

## 2025-01-24 RX ADMIN — PROCHLORPERAZINE MALEATE 5 MG: 5 TABLET ORAL at 17:11

## 2025-01-24 RX ADMIN — SUCRALFATE ORAL 1 G: 1 SUSPENSION ORAL at 17:51

## 2025-01-24 RX ADMIN — NITROGLYCERIN 0.4 MG: 0.4 TABLET SUBLINGUAL at 22:38

## 2025-01-24 RX ADMIN — ACETAMINOPHEN 1000 MG: 500 TABLET, FILM COATED ORAL at 21:37

## 2025-01-24 ASSESSMENT — ACTIVITIES OF DAILY LIVING (ADL)
ADLS_ACUITY_SCORE: 40
ADLS_ACUITY_SCORE: 42
ADLS_ACUITY_SCORE: 40
ADLS_ACUITY_SCORE: 42
ADLS_ACUITY_SCORE: 40
ADLS_ACUITY_SCORE: 42
ADLS_ACUITY_SCORE: 40
ADLS_ACUITY_SCORE: 42
ADLS_ACUITY_SCORE: 40
ADLS_ACUITY_SCORE: 42
ADLS_ACUITY_SCORE: 40

## 2025-01-24 NOTE — PROGRESS NOTES
Interdisciplinary Discharge Planning Note    Anticipated Discharge Date: 1/27-1/28    Anticipated Discharge Location: home    Clinical Needs Before Discharge:  adequate comfort achieved, adequate fluid and/or nutritional intake, and stable oxygen requirement    Treatment Needs After Discharge:  None identified    Potential Barriers to Discharge: None identified at this time    SARAH Rendon  1/24/2025,  12:10 PM

## 2025-01-24 NOTE — PROGRESS NOTES
SAFETY CHECKLIST  ID Bands and Risk clasps correct and in place (DNR, Fall risk, Allergy, Latex, Limb):  Yes  All Lines Reconciled and labeled correctly: Yes  Whiteboard updated:Yes  Environmental interventions: Yes  Verify Tele #:        Shaylee Henson RN 1/24/2025 8:09 AM

## 2025-01-24 NOTE — PLAN OF CARE
Goal Outcome Evaluation:      BP elevated, VSS otherwise, febrile, patient complains of pain at chest tube insertion site. PRN medications utilized and effective. Patient refused to ambulate in hallway today or sit up in chair. Patient has been ambulating to the bathroom with walker, gait belt, and assistance from 1 staff member. Chest tube dressing is CDI. Chest tube output for shift is 1 mL. 2 allevyn dressings to blanchable red areas on spine.   PRN Zofran given for nausea and dry heaving- was not effective PRN compazine given was effective.         Plan of Care Reviewed With: patient          Outcome Evaluation: BP elevated, VSS otherwise, afebrile, paitent complains of pain at chest tube insertion site. PRN medications utlized and effective.      Shaylee Henson RN 1/24/2025 5:53 PM

## 2025-01-24 NOTE — PROGRESS NOTES
[facility]    Medicine Progress Note - Hospitalist Service    Date of Admission:  1/20/2025    Assessment & Plan      Karen Rodriguez is a 79 year old woman with a  past medical history of pulmonary emphysema, chronic diastolic heart failure, rheumatoid arthritis, prior stroke, hyperlipidemia, hypertension, prior tobacco use who was admitted to the hospital after a pneumothorax during her stereotactic biopsy of her lung. She unfortunately has had a persistent air leak.     Principal Problem:    Pneumothorax    Pulmonary emphysema, unspecified emphysema type (H)    Assessment: Pneumothorax after stereotactic biopsy of the right lower lobe concerning for malignancy.  Has a persistent air leak at this time. Slowly improving    Plan:   -Chest tube to Water seal  -chest x-ray daily  -General surgery following  -scheduled acetaminophen 1000mg 4 times daily  -As needed  oxycodone and hydromorphone for pain  -Supplemental oxygen for reabsorption  -incentive spirometer  -ambulate    Active Problems:    Chronic diastolic heart failure (H)    Hypertensive heart disease with congestive heart failure (H)  CKD stage 3b    Assessment: Appears euvolemic at this time, not on diuretics prior to admission.        Essential (primary) hypertension    Assessment: PTA on amlodipine 5mg daily, Carvedilol 25mg BID.     Plan:   -hold amlodipine 5mg daily  -hold carvedilol 25mg BID      Rheumatoid arthritis (H)    Assessment: Does not appear to be on any controller medications at this time.    History of Right-sided lacunar infarction (H)    HLD (hyperlipidemia)    Assessment: Prior to admission on atorvastatin 40 mg daily does not appear to be on aspirin.    Plan:   -atorvastatin 40mg daily    Anemia  Assessment: Normocytic with a decline in her hemoglobin from 10.8 on 12- down 8.5-9. Minimal output from her chest tube.  Could be dilutional versus some blood loss.  Will continue to trend  Plan:   -trend  hemoglobin            Diet: Combination Diet Regular Diet Adult    DVT Prophylaxis: Pneumatic Compression Devices  Echavarria Catheter: Not present  Lines: None     Cardiac Monitoring: None  Code Status: Full Code      Clinically Significant Risk Factors         # Hyponatremia: Lowest Na = 134 mmol/L in last 2 days, will monitor as appropriate           # Hypertension: Noted on problem list                         Social Drivers of Health    Tobacco Use: Medium Risk (12/30/2024)    [facility]    Patient History     Smoking Tobacco Use: Former     Smokeless Tobacco Use: Never   Physical Activity: Inactive (6/19/2024)    Received from [facility]    Exercise Vital Sign     Days of Exercise per Week: 0 days     Minutes of Exercise per Session: 0 min   Social Connections: Socially Isolated (6/19/2024)    Received from [facility]    Social Connection and Isolation Panel [NHANES]     Frequency of Communication with Friends and Family: Twice a week     Frequency of Social Gatherings with Friends and Family: More than three times a week     Attends Muslim Services: Never     Active Member of Clubs or Organizations: No     Attends Club or Organization Meetings: Never     Marital Status:           Disposition Plan     Medically Ready for Discharge: Anticipated in 2-4 Days             Eron Rodriguez MD  Hospitalist Service  [facility]  Securely message with [employer] (more info)  Text page via [employer] Paging/Directory   ______________________________________________________________________    Interval History   Pain is more controlled.  She has been up and ambulating with nursing staff and a walker.  She still has a air leak but it is definitely less prevalent from yesterday.    Physical Exam   Vital Signs: Temp: 97.9  F (36.6  C) Temp src: Tympanic BP: 132/56 Pulse: 72   Resp: 16 SpO2:  96 % O2 Device: Nasal cannula Oxygen Delivery: 1 LPM  Weight: 101 lbs 11.2 oz  General: Pleasant 79-year-old woman lying in bed no acute distress  Pulmonary: Unlabored breathing, clear to auscultation a slowing air leak  CV: Regular rate and rhythm  GI: Soft nontender abdomen    Medical Decision Making             Data     I have personally reviewed the following data over the past 24 hrs:    3.8 (L)  \   8.9 (L)   / 184     134 (L) 104 24.6 (H) /  84   3.9 24 1.25 (H) \       Imaging results reviewed over the past 24 hrs:   Recent Results (from the past 24 hours)   XR Chest Port 1 View    Narrative    EXAM: XR CHEST PORT 1 VIEW  LOCATION: [facility]  DATE: 1/24/2025    INDICATION: follow up pneumothorax  COMPARISON: 1/22/2025      Impression    IMPRESSION: Stable right chest tube. Slight increase in size of a trace right apical pneumothorax. Bibasilar atelectasis. Normal heart size. Subcutaneous emphysema in the right chest wall. Bilateral shoulder arthroplasties.

## 2025-01-24 NOTE — PLAN OF CARE
Goal Outcome Evaluation:      Plan of Care Reviewed With: patient    Overall Patient Progress: no changeOverall Patient Progress: no change    VSS, afebrile, pain 6-7/10 - scheduled tylenol and PRN oxy are helping.  Alert and oriented, A1 with walker.  Urinating without issue.  Chest tube dressing is CD&I, output is scant to nothing.  New Allevyn placed on reddened area of her spine. Able to sleep majority of the night.  Haydee Alston RN............................ 1/24/2025 6:03 AM

## 2025-01-25 LAB
ERYTHROCYTE [DISTWIDTH] IN BLOOD BY AUTOMATED COUNT: 13.7 % (ref 10–15)
HCT VFR BLD AUTO: 29.4 % (ref 35–47)
HGB BLD-MCNC: 9.1 G/DL (ref 11.7–15.7)
MCH RBC QN AUTO: 31 PG (ref 26.5–33)
MCHC RBC AUTO-ENTMCNC: 31 G/DL (ref 31.5–36.5)
MCV RBC AUTO: 100 FL (ref 78–100)
PLATELET # BLD AUTO: 194 10E3/UL (ref 150–450)
RBC # BLD AUTO: 2.94 10E6/UL (ref 3.8–5.2)
TROPONIN T SERPL HS-MCNC: 16 NG/L
WBC # BLD AUTO: 4.1 10E3/UL (ref 4–11)

## 2025-01-25 PROCEDURE — 120N000001 HC R&B MED SURG/OB

## 2025-01-25 PROCEDURE — 99231 SBSQ HOSP IP/OBS SF/LOW 25: CPT | Performed by: SURGERY

## 2025-01-25 PROCEDURE — 250N000013 HC RX MED GY IP 250 OP 250 PS 637: Performed by: INTERNAL MEDICINE

## 2025-01-25 PROCEDURE — 250N000013 HC RX MED GY IP 250 OP 250 PS 637: Performed by: STUDENT IN AN ORGANIZED HEALTH CARE EDUCATION/TRAINING PROGRAM

## 2025-01-25 PROCEDURE — 84484 ASSAY OF TROPONIN QUANT: CPT | Performed by: HOSPITALIST

## 2025-01-25 PROCEDURE — 85014 HEMATOCRIT: CPT | Performed by: HOSPITALIST

## 2025-01-25 PROCEDURE — 250N000011 HC RX IP 250 OP 636: Performed by: STUDENT IN AN ORGANIZED HEALTH CARE EDUCATION/TRAINING PROGRAM

## 2025-01-25 PROCEDURE — 36415 COLL VENOUS BLD VENIPUNCTURE: CPT | Performed by: HOSPITALIST

## 2025-01-25 PROCEDURE — 999N000157 HC STATISTIC RCP TIME EA 10 MIN

## 2025-01-25 PROCEDURE — 99232 SBSQ HOSP IP/OBS MODERATE 35: CPT | Mod: 25 | Performed by: STUDENT IN AN ORGANIZED HEALTH CARE EDUCATION/TRAINING PROGRAM

## 2025-01-25 RX ADMIN — PROCHLORPERAZINE MALEATE 5 MG: 5 TABLET ORAL at 00:01

## 2025-01-25 RX ADMIN — ACETAMINOPHEN 1000 MG: 500 TABLET, FILM COATED ORAL at 08:46

## 2025-01-25 RX ADMIN — CALCIUM CARBONATE (ANTACID) CHEW TAB 500 MG 1000 MG: 500 CHEW TAB at 20:00

## 2025-01-25 RX ADMIN — ACETAMINOPHEN 1000 MG: 500 TABLET, FILM COATED ORAL at 16:32

## 2025-01-25 RX ADMIN — FAMOTIDINE 20 MG: 20 TABLET, FILM COATED ORAL at 09:42

## 2025-01-25 RX ADMIN — OXYCODONE HYDROCHLORIDE 2.5 MG: 5 TABLET ORAL at 16:43

## 2025-01-25 RX ADMIN — SUCRALFATE ORAL 1 G: 1 SUSPENSION ORAL at 21:23

## 2025-01-25 RX ADMIN — ACETAMINOPHEN 1000 MG: 500 TABLET, FILM COATED ORAL at 21:23

## 2025-01-25 RX ADMIN — SUCRALFATE ORAL 1 G: 1 SUSPENSION ORAL at 16:33

## 2025-01-25 RX ADMIN — ATORVASTATIN CALCIUM 80 MG: 40 TABLET, FILM COATED ORAL at 21:24

## 2025-01-25 RX ADMIN — SUCRALFATE ORAL 1 G: 1 SUSPENSION ORAL at 12:11

## 2025-01-25 RX ADMIN — ONDANSETRON 4 MG: 4 TABLET, ORALLY DISINTEGRATING ORAL at 12:15

## 2025-01-25 RX ADMIN — SUCRALFATE ORAL 1 G: 1 SUSPENSION ORAL at 06:47

## 2025-01-25 RX ADMIN — PROCHLORPERAZINE MALEATE 5 MG: 5 TABLET ORAL at 13:05

## 2025-01-25 RX ADMIN — ACETAMINOPHEN 1000 MG: 500 TABLET, FILM COATED ORAL at 12:47

## 2025-01-25 ASSESSMENT — ACTIVITIES OF DAILY LIVING (ADL)
ADLS_ACUITY_SCORE: 39
ADLS_ACUITY_SCORE: 40
ADLS_ACUITY_SCORE: 39
ADLS_ACUITY_SCORE: 40
ADLS_ACUITY_SCORE: 37
ADLS_ACUITY_SCORE: 39
ADLS_ACUITY_SCORE: 40
ADLS_ACUITY_SCORE: 37
ADLS_ACUITY_SCORE: 39
ADLS_ACUITY_SCORE: 40
ADLS_ACUITY_SCORE: 39
ADLS_ACUITY_SCORE: 37
ADLS_ACUITY_SCORE: 39
ADLS_ACUITY_SCORE: 40
ADLS_ACUITY_SCORE: 39
ADLS_ACUITY_SCORE: 37
ADLS_ACUITY_SCORE: 37
ADLS_ACUITY_SCORE: 40
ADLS_ACUITY_SCORE: 39
ADLS_ACUITY_SCORE: 40
ADLS_ACUITY_SCORE: 40

## 2025-01-25 NOTE — PLAN OF CARE
Goal Outcome Evaluation:    BP elevated, VSS otherwise, afebrile, patient complains of pain at chest tube insertion site - PRN medications utilized was effective patient has nausea and dry heaving PRN zofran given- not effective PRN compazine given- effective. Chest tube dressing has minimal serosanguinous drainage on it. Chest tube has had 5mL of serosanguinous output during shift. Patient still continues to have a air leak. Crepitus noted around area of chest tube insertion.   2 allevyn dressings to spine for blanchable red areas. Patient denies any shortness of breath during shift. Patient is on RA.     Patient sat up in recliner for lunch today and has been ambulating in her room with assistance from 1 staff member, gait belt and walker.         Plan of Care Reviewed With: patient    Overall Patient Progress: no changeOverall Patient Progress: no change    Outcome Evaluation: BP elevated, VSS otherwise, afebrile, patient complains of pain at chest tube insertion site.- PRN medications utlized.      Shaylee Henson RN 1/25/2025 6:40 PM

## 2025-01-25 NOTE — PROGRESS NOTES
Pt remained on Room Air overnight.  EKG performed during shift due to chest tightness.  Respiratory will continue to provide support as needed.    Janeen Lambert, RT

## 2025-01-25 NOTE — PROGRESS NOTES
PROGRESS NOTE      Following chest tube    S: Airleak yesterday with normal respiratory cycle while sleeping.  No airleak today.    Date 01/25/25 0700 - 01/26/25 0659   Shift 4697-8148 6389-5374 1491-2672 24 Hour Total   INTAKE   P.O. 72   72   Shift Total(mL/kg) 72(1.48)   72(1.48)   OUTPUT   Urine 50   50   Shift Total(mL/kg) 50(1.03)   50(1.03)   Weight (kg) 48.72 48.72 48.72 48.72        GENERAL: alert, NAD  Chest: No leak with forced expiration    LABS  Recent Labs   Lab Test 01/25/25  0058 01/24/25  0555   WBC 4.1 3.8*   RBC 2.94* 2.87*   HGB 9.1* 8.9*   HCT 29.4* 27.9*    97   MCH 31.0 31.0   MCHC 31.0* 31.9    184       Recent Labs   Lab Test 01/24/25 2253 01/24/25  0555   POTASSIUM 3.7 3.9   CHLORIDE 103 104   BUN 23.5* 24.6*       Recent Labs   Lab Test 01/24/25 2253 01/20/25  0803   BILITOTAL 0.3 0.4   AST 91* 40   ALT 75* 26       IMAGING      ASSESSMENT  Frail 79-year-old female with pneumothorax and ongoing air leak intermittently following lung/pleural biopsy.    Not fit for aggressive intervention  Could attempt blood patch if airleak persists    Leon Sánchez MD on 1/25/2025 at 12:06 PM

## 2025-01-25 NOTE — PLAN OF CARE
/64 (BP Location: Right arm, Patient Position: Semi-Diana's, Cuff Size: Adult Regular)   Pulse 71   Temp 98.3  F (36.8  C) (Tympanic)   Resp 14   Wt 46.1 kg (101 lb 11.2 oz)   SpO2 (!) 91%   BMI 20.54 kg/m      Pt A&O, VSS, afebrile, rates pain 6/10. PRN oxycodone given with relief. On room air, LS diminished. Compazine given for nausea. Pt c/o chest tightness. Provider notified. EKG and chest xray ordered. Nitroglycerin given x2 with relief. Pt had episode of hypotension. Lowest reading of 70/35. Provider notified. Bolus administered. BP increased to 128/64. Chest tube dressing CDI. Pt continues to have air leak. Ax1 w/ walker.

## 2025-01-25 NOTE — PROGRESS NOTES
SAFETY CHECKLIST  ID Bands and Risk clasps correct and in place (DNR, Fall risk, Allergy, Latex, Limb):  Yes  All Lines Reconciled and labeled correctly: Yes  Whiteboard updated:Yes  Environmental interventions: Yes  Verify Tele #:      Shaylee Henson RN 1/25/2025 8:48 AM

## 2025-01-25 NOTE — PROGRESS NOTES
[facility]    Medicine Progress Note - Hospitalist Service    Date of Admission:  1/20/2025    Assessment & Plan      Karen Rodriguez is a 79 year old woman with a  past medical history of pulmonary emphysema, chronic diastolic heart failure, rheumatoid arthritis, prior stroke, hyperlipidemia, hypertension, prior tobacco use who was admitted to the hospital after a pneumothorax during her stereotactic biopsy of her lung. She unfortunately has had a persistent air leak.     Principal Problem:    Pneumothorax    Pulmonary emphysema, unspecified emphysema type (H)    Assessment: Pneumothorax after stereotactic biopsy of the right lower lobe concerning for malignancy.  Airleak greatly improved and not present on my exam today.  Intermittently having chest pain which is likely related to her pneumothorax/chest tube.  Troponins have been negative multiple times.    Plan:   -Chest tube to Water seal  -chest x-ray daily  -General surgery following  -scheduled acetaminophen 1000mg 4 times daily  -As needed  oxycodone and hydromorphone for pain  -Supplemental oxygen for reabsorption  -incentive spirometer  -ambulate    Active Problems:    Chronic diastolic heart failure (H)    Hypertensive heart disease with congestive heart failure (H)  CKD stage 3b    Assessment: Appears euvolemic at this time, not on diuretics prior to admission.        Essential (primary) hypertension    Assessment: PTA on amlodipine 5mg daily, Carvedilol 25mg BID.     Plan:   -hold amlodipine 5mg daily  -hold carvedilol 25mg BID      Rheumatoid arthritis (H)    Assessment: Does not appear to be on any controller medications at this time.    History of Right-sided lacunar infarction (H)    HLD (hyperlipidemia)    Assessment: Prior to admission on atorvastatin 40 mg daily does not appear to be on aspirin.    Plan:   -atorvastatin 40mg daily    Anemia  Assessment: Normocytic with a decline in her hemoglobin from 10.8 on 12-  down 8.5-9. Minimal output from her chest tube.  Could be dilutional versus some blood loss.  Will continue to trend  Plan:   -trend hemoglobin            Diet: Combination Diet Regular Diet Adult    DVT Prophylaxis: Pneumatic Compression Devices  Echavarria Catheter: Not present  Lines: None     Cardiac Monitoring: None  Code Status: Full Code      Clinically Significant Risk Factors         # Hyponatremia: Lowest Na = 132 mmol/L in last 2 days, will monitor as appropriate       # Hypoalbuminemia: Lowest albumin = 2.6 g/dL at 1/24/2025 10:53 PM, will monitor as appropriate     # Hypertension: Noted on problem list                         Social Drivers of Health    Tobacco Use: Medium Risk (12/30/2024)    Received from [facility] [employer]    Patient History     Smoking Tobacco Use: Former     Smokeless Tobacco Use: Never   Physical Activity: Inactive (6/19/2024)    Received from [facility] [employer]    Exercise Vital Sign     Days of Exercise per Week: 0 days     Minutes of Exercise per Session: 0 min   Social Connections: Socially Isolated (6/19/2024)    Received from [facility] [employer] [facility]    Social Connection and Isolation Panel [NHANES]     Frequency of Communication with Friends and Family: Twice a week     Frequency of Social Gatherings with Friends and Family: More than three times a week     Attends Mosque Services: Never     Active Member of Clubs or Organizations: No     Attends Club or Organization Meetings: Never     Marital Status:           Disposition Plan     Medically Ready for Discharge: Anticipated in 2-4 Days             Eron Rodriguez MD  Hospitalist Service  [facility]  Securely message with [employer] (more info)  Text page via [employer] Paging/Directory   ______________________________________________________________________    Interval History   Pain overnight, chest x-ray was performed.  Reviewed  this with patient with slight increase in her small pneumothorax.  Her atrium does not have a significant water leak today.  She states her pain is controlled now.  She has been up walking with nursing.  Afebrile    Physical Exam   Vital Signs: Temp: 97.6  F (36.4  C) Temp src: Tympanic BP: 131/64 Pulse: 78   Resp: 16 SpO2: (!) 91 % O2 Device: None (Room air)    Weight: 107 lbs 6.4 oz  General: Pleasant 79-year-old woman lying in bed no acute distress  Pulmonary: Unlabored breathing, clear to auscultation a slowing air leak  CV: Regular rate and rhythm  GI: Soft nontender abdomen    Medical Decision Making             Data     I have personally reviewed the following data over the past 24 hrs:    4.1  \   9.1 (L)   / 194     132 (L) 103 23.5 (H) /  115 (H)   3.7 20 (L) 0.97 (H) \     ALT: 75 (H) AST: 91 (H) AP: 200 (H) TBILI: 0.3   ALB: 2.6 (L) TOT PROTEIN: 6.6 LIPASE: N/A     Trop: 16 (H) BNP: N/A       Imaging results reviewed over the past 24 hrs:   Recent Results (from the past 24 hours)   XR Chest Port 1 View    Narrative    EXAM: XR CHEST PORT 1 VIEW  LOCATION: [facility]  DATE: 1/24/2025    INDICATION: Dyspnea  COMPARISON: 1/24/2025 at 0623 hour      Impression    IMPRESSION: Right chest tube is in stable position. Small right apical pneumothorax has mildly increased. No significant mediastinal shift. Extensive subcutaneous gas in the right chest wall has increased. Heart size and pulmonary vascularity are within   normal limits. Mild streaky atelectasis in the lung bases. No confluent consolidation pleural effusion or left pneumothorax. A small hiatal hernia is again noted. Reverse right total shoulder arthroplasty and left humeral prosthesis.

## 2025-01-25 NOTE — PROGRESS NOTES
Shift Summary    Patient is currently on Room Air. Pt has Chest tube in place on right side of thorax. SpO2 93% on last assessment at 1814 PM. No further interventions at this time.    Rambo Houston, RT

## 2025-01-26 ENCOUNTER — APPOINTMENT (OUTPATIENT)
Dept: GENERAL RADIOLOGY | Facility: OTHER | Age: 80
End: 2025-01-26
Attending: STUDENT IN AN ORGANIZED HEALTH CARE EDUCATION/TRAINING PROGRAM
Payer: MEDICARE

## 2025-01-26 ENCOUNTER — APPOINTMENT (OUTPATIENT)
Dept: GENERAL RADIOLOGY | Facility: OTHER | Age: 80
DRG: 199 | End: 2025-01-26
Attending: SURGERY
Payer: MEDICARE

## 2025-01-26 PROCEDURE — 71045 X-RAY EXAM CHEST 1 VIEW: CPT

## 2025-01-26 PROCEDURE — 99231 SBSQ HOSP IP/OBS SF/LOW 25: CPT | Performed by: SURGERY

## 2025-01-26 PROCEDURE — 99232 SBSQ HOSP IP/OBS MODERATE 35: CPT | Mod: 25 | Performed by: STUDENT IN AN ORGANIZED HEALTH CARE EDUCATION/TRAINING PROGRAM

## 2025-01-26 PROCEDURE — 250N000013 HC RX MED GY IP 250 OP 250 PS 637: Performed by: INTERNAL MEDICINE

## 2025-01-26 PROCEDURE — 120N000001 HC R&B MED SURG/OB

## 2025-01-26 PROCEDURE — 250N000011 HC RX IP 250 OP 636: Performed by: STUDENT IN AN ORGANIZED HEALTH CARE EDUCATION/TRAINING PROGRAM

## 2025-01-26 PROCEDURE — 250N000013 HC RX MED GY IP 250 OP 250 PS 637: Performed by: STUDENT IN AN ORGANIZED HEALTH CARE EDUCATION/TRAINING PROGRAM

## 2025-01-26 PROCEDURE — 999N000065 XR CHEST PORT 1 VIEW

## 2025-01-26 RX ORDER — ATORVASTATIN CALCIUM 40 MG/1
40 TABLET, FILM COATED ORAL AT BEDTIME
Status: DISCONTINUED | OUTPATIENT
Start: 2025-01-26 | End: 2025-01-27 | Stop reason: HOSPADM

## 2025-01-26 RX ADMIN — SUCRALFATE ORAL 1 G: 1 SUSPENSION ORAL at 06:30

## 2025-01-26 RX ADMIN — ACETAMINOPHEN 1000 MG: 500 TABLET, FILM COATED ORAL at 09:03

## 2025-01-26 RX ADMIN — SUCRALFATE ORAL 1 G: 1 SUSPENSION ORAL at 17:34

## 2025-01-26 RX ADMIN — ACETAMINOPHEN 1000 MG: 500 TABLET, FILM COATED ORAL at 13:15

## 2025-01-26 RX ADMIN — ACETAMINOPHEN 1000 MG: 500 TABLET, FILM COATED ORAL at 21:17

## 2025-01-26 RX ADMIN — PROCHLORPERAZINE MALEATE 5 MG: 5 TABLET ORAL at 23:29

## 2025-01-26 RX ADMIN — SUCRALFATE ORAL 1 G: 1 SUSPENSION ORAL at 21:17

## 2025-01-26 RX ADMIN — ATORVASTATIN CALCIUM 40 MG: 40 TABLET, FILM COATED ORAL at 21:17

## 2025-01-26 RX ADMIN — PROCHLORPERAZINE MALEATE 5 MG: 5 TABLET ORAL at 13:22

## 2025-01-26 RX ADMIN — ONDANSETRON 4 MG: 4 TABLET, ORALLY DISINTEGRATING ORAL at 09:03

## 2025-01-26 RX ADMIN — ONDANSETRON 4 MG: 4 TABLET, ORALLY DISINTEGRATING ORAL at 16:33

## 2025-01-26 RX ADMIN — CARVEDILOL 25 MG: 12.5 TABLET, FILM COATED ORAL at 17:34

## 2025-01-26 RX ADMIN — SUCRALFATE ORAL 1 G: 1 SUSPENSION ORAL at 12:25

## 2025-01-26 ASSESSMENT — ACTIVITIES OF DAILY LIVING (ADL)
ADLS_ACUITY_SCORE: 39

## 2025-01-26 NOTE — PLAN OF CARE
/53 (BP Location: Right arm, Patient Position: Semi-Diana's, Cuff Size: Adult Small)   Pulse 74   Temp 97.3  F (36.3  C) (Tympanic)   Resp 15   Wt 46.4 kg (102 lb 6.4 oz)   SpO2 (!) 90%   BMI 20.68 kg/m      Pt A&O, VSS, afebrile, rates incisional pain from chest tube 4-5/10. Pt declines intervention at this time. On room air, LS diminished. Pt c/o intermittent mild nausea. Declined antiemetic at this time. Tums given for acid reflux. Minimal drainage on chest tube dressing. Air leak still present. Pt had 2ml serosanguinous output. Ax1 w/ walker.

## 2025-01-26 NOTE — PROGRESS NOTES
SAFETY CHECKLIST  ID Bands and Risk clasps correct and in place (DNR, Fall risk, Allergy, Latex, Limb):  Yes  All Lines Reconciled and labeled correctly: Yes  Whiteboard updated:Yes  Environmental interventions: Yes  Verify Tele #:        Shaylee Henson RN 1/26/2025 9:48 AM

## 2025-01-26 NOTE — PROGRESS NOTES
Chest tube insertion site dressing is CDI. Patient denies pain at this time and denies shortness of breath. O2 91% on RA.       Shaylee Henson RN 1/26/2025 11:02 AM

## 2025-01-26 NOTE — PROGRESS NOTES
[facility]    Medicine Progress Note - Hospitalist Service    Date of Admission:  1/20/2025    Assessment & Plan      Karen Rodriguez is a 79 year old woman with a  past medical history of pulmonary emphysema, chronic diastolic heart failure, rheumatoid arthritis, prior stroke, hyperlipidemia, hypertension, prior tobacco use who was admitted to the hospital after a pneumothorax during her stereotactic biopsy of her lung. She unfortunately had a persistent air leak. Chest tube removed 1/26/2025.       Principal Problem:    Pneumothorax    Pulmonary emphysema, unspecified emphysema type (H)  Acute Hypoxic respiratory failure    Assessment: Pneumothorax after stereotactic biopsy of the right lower lobe concerning for malignancy.  Airleak greatly improved and not present on my exam today.  Intermittently having chest pain which is likely related to her pneumothorax/chest tube.  Troponins have been negative multiple times.    Plan:   -Chest tube removed today  -chest x-ray 1500  -General surgery following  -scheduled acetaminophen 1000mg 4 times daily  -As needed  oxycodone and hydromorphone for pain  -Supplemental oxygen for reabsorption  -incentive spirometer  -ambulate    Active Problems:    Chronic diastolic heart failure (H)    Hypertensive heart disease with congestive heart failure (H)  CKD stage 3b    Assessment: Appears euvolemic at this time, not on diuretics prior to admission.        Essential (primary) hypertension    Assessment: PTA on amlodipine 5mg daily, Carvedilol 25mg BID.     Plan:   -hold amlodipine 5mg daily  -resume carvedilol 25mg BID      Rheumatoid arthritis (H)    Assessment: Does not appear to be on any controller medications at this time.    History of Right-sided lacunar infarction (H)    HLD (hyperlipidemia)    Assessment: Prior to admission on atorvastatin 40 mg daily does not appear to be on aspirin.    Plan:   -atorvastatin 40mg daily    Anemia  Assessment:  Normocytic with a decline in her hemoglobin from 10.8 on 12- down 8.5-9. Minimal output from her chest tube.  Could be dilutional versus some blood loss.  Will continue to trend  Plan:   -trend hemoglobin            Diet: Combination Diet Regular Diet Adult    DVT Prophylaxis: Pneumatic Compression Devices  Echavarria Catheter: Not present  Lines: None     Cardiac Monitoring: None  Code Status: Full Code      Clinically Significant Risk Factors         # Hyponatremia: Lowest Na = 132 mmol/L in last 2 days, will monitor as appropriate       # Hypoalbuminemia: Lowest albumin = 2.6 g/dL at 1/24/2025 10:53 PM, will monitor as appropriate     # Hypertension: Noted on problem list                         Social Drivers of Health    Tobacco Use: Medium Risk (12/30/2024)    [facility] [employer]    Patient History     Smoking Tobacco Use: Former     Smokeless Tobacco Use: Never   Physical Activity: Inactive (6/19/2024)    [facility] [employer] [facility] [employer] [facility]    Exercise Vital Sign     Days of Exercise per Week: 0 days     Minutes of Exercise per Session: 0 min   Social Connections: Socially Isolated (6/19/2024)    [facility] [employer] [facility] [employer] [facility]    Social Connection and Isolation Panel [NHANES]     Frequency of Communication with Friends and Family: Twice a week     Frequency of Social Gatherings with Friends and Family: More than three times a week     Attends Judaism Services: Never     Active Member of Clubs or Organizations: No     Attends Club or Organization Meetings: Never     Marital Status:           Disposition Plan     Medically Ready for Discharge: Anticipated in 2-4 Days             Eron Rodriguez MD  Hospitalist Service  [facility]  Securely message with [employer] (more info)  Text page via [employer] Paging/Directory    ______________________________________________________________________    Interval History   Having more pain this morning.  She has had no air leak for greater than 24 hours.  General surgery to remove tube this a.m. with follow-up x-ray this afternoon.  Having some nausea due to pain.    Physical Exam   Vital Signs: Temp: 98.6  F (37  C) Temp src: Tympanic BP: 134/65 Pulse: 90   Resp: 14 SpO2: (!) 91 % O2 Device: None (Room air)    Weight: 102 lbs 6.4 oz  General: Pleasant 79-year-old woman lying in bed no acute distress  Pulmonary: Unlabored breathing, clear to auscultation no airleak present  CV: Regular rate and rhythm  GI: Soft nontender abdomen    Medical Decision Making             Data         Imaging results reviewed over the past 24 hrs:   Recent Results (from the past 24 hours)   XR Chest Port 1 View    Narrative    EXAM: XR CHEST PORT 1 VIEW  LOCATION: [facility]  DATE: 1/26/2025    INDICATION: follow up pneumothorax  COMPARISON: 01/24/2025.      Impression    IMPRESSION: Slight interval retraction of right chest with distal tip projecting at the right lateral upper lung. A trace right pneumothorax has decreased in size. Possible trace right pleural effusion. Improved aeration of the lungs with improved   streaky bibasilar and perihilar atelectasis. Stable cardiac silhouette. Hiatal hernia. Persistent subcutaneous emphysema of the right chest wall. Bilateral shoulder arthroplasties with reverse arthroplasty on the right.

## 2025-01-26 NOTE — PROGRESS NOTES
PROGRESS NOTE      Following chest tube    S: No air leak today    Date 01/25/25 0700 - 01/26/25 0659   Shift 4217-4604 [id number] 24 Hour Total   INTAKE   P.O. 72   72   Shift Total(mL/kg) 72(1.48)   72(1.48)   OUTPUT   Urine 50   50   Shift Total(mL/kg) 50(1.03)   50(1.03)   Weight (kg) 48.72 48.72 48.72 48.72        GENERAL: alert, NAD  Chest: No leak with forced expiration    LABS  Recent Labs   Lab Test 01/25/25  0058 01/24/25  0555   WBC 4.1 3.8*   RBC 2.94* 2.87*   HGB 9.1* 8.9*   HCT 29.4* 27.9*    97   MCH 31.0 31.0   MCHC 31.0* 31.9    184       Recent Labs   Lab Test 01/24/25  2253 01/24/25  0555   POTASSIUM 3.7 3.9   CHLORIDE 103 104   BUN 23.5* 24.6*       Recent Labs   Lab Test 01/24/25 2253 01/20/25  0803   BILITOTAL 0.3 0.4   AST 91* 40   ALT 75* 26       IMAGING    CXR stable to improved       ASSESSMENT  Frail 79-year-old female with pneumothorax and ongoing air leak intermittently following lung/pleural biopsy.      - Chest tube removed     - Follow-up this afternoon.

## 2025-01-27 ENCOUNTER — APPOINTMENT (OUTPATIENT)
Dept: GENERAL RADIOLOGY | Facility: OTHER | Age: 80
End: 2025-01-27
Attending: SURGERY
Payer: MEDICARE

## 2025-01-27 VITALS
OXYGEN SATURATION: 96 % | WEIGHT: 107.3 LBS | TEMPERATURE: 98.6 F | BODY MASS INDEX: 21.67 KG/M2 | RESPIRATION RATE: 16 BRPM | SYSTOLIC BLOOD PRESSURE: 114 MMHG | DIASTOLIC BLOOD PRESSURE: 56 MMHG | HEART RATE: 72 BPM

## 2025-01-27 LAB
ALBUMIN SERPL BCG-MCNC: 2.7 G/DL (ref 3.5–5.2)
ALP SERPL-CCNC: 203 U/L (ref 40–150)
ALT SERPL W P-5'-P-CCNC: 45 U/L (ref 0–50)
ANION GAP SERPL CALCULATED.3IONS-SCNC: 6 MMOL/L (ref 7–15)
AST SERPL W P-5'-P-CCNC: 49 U/L (ref 0–45)
ATRIAL RATE - MUSE: 68 BPM
BILIRUB SERPL-MCNC: 0.3 MG/DL
BUN SERPL-MCNC: 20.9 MG/DL (ref 8–23)
CALCIUM SERPL-MCNC: 8.4 MG/DL (ref 8.8–10.4)
CHLORIDE SERPL-SCNC: 106 MMOL/L (ref 98–107)
CREAT SERPL-MCNC: 1.06 MG/DL (ref 0.51–0.95)
DIASTOLIC BLOOD PRESSURE - MUSE: NORMAL MMHG
EGFRCR SERPLBLD CKD-EPI 2021: 53 ML/MIN/1.73M2
ERYTHROCYTE [DISTWIDTH] IN BLOOD BY AUTOMATED COUNT: 14 % (ref 10–15)
GLUCOSE SERPL-MCNC: 76 MG/DL (ref 70–99)
HCO3 SERPL-SCNC: 23 MMOL/L (ref 22–29)
HCT VFR BLD AUTO: 29.1 % (ref 35–47)
HGB BLD-MCNC: 9.4 G/DL (ref 11.7–15.7)
INTERPRETATION ECG - MUSE: NORMAL
MCH RBC QN AUTO: 30.4 PG (ref 26.5–33)
MCHC RBC AUTO-ENTMCNC: 32.3 G/DL (ref 31.5–36.5)
MCV RBC AUTO: 94 FL (ref 78–100)
P AXIS - MUSE: 74 DEGREES
PLATELET # BLD AUTO: 223 10E3/UL (ref 150–450)
POTASSIUM SERPL-SCNC: 3.4 MMOL/L (ref 3.4–5.3)
PR INTERVAL - MUSE: 170 MS
PROT SERPL-MCNC: 6.8 G/DL (ref 6.4–8.3)
QRS DURATION - MUSE: 82 MS
QT - MUSE: 440 MS
QTC - MUSE: 467 MS
R AXIS - MUSE: -14 DEGREES
RBC # BLD AUTO: 3.09 10E6/UL (ref 3.8–5.2)
SODIUM SERPL-SCNC: 135 MMOL/L (ref 135–145)
SYSTOLIC BLOOD PRESSURE - MUSE: NORMAL MMHG
T AXIS - MUSE: 69 DEGREES
VENTRICULAR RATE- MUSE: 68 BPM
WBC # BLD AUTO: 4.5 10E3/UL (ref 4–11)

## 2025-01-27 PROCEDURE — 85014 HEMATOCRIT: CPT | Performed by: STUDENT IN AN ORGANIZED HEALTH CARE EDUCATION/TRAINING PROGRAM

## 2025-01-27 PROCEDURE — 99239 HOSP IP/OBS DSCHRG MGMT >30: CPT | Performed by: INTERNAL MEDICINE

## 2025-01-27 PROCEDURE — 71045 X-RAY EXAM CHEST 1 VIEW: CPT

## 2025-01-27 PROCEDURE — 85041 AUTOMATED RBC COUNT: CPT | Performed by: STUDENT IN AN ORGANIZED HEALTH CARE EDUCATION/TRAINING PROGRAM

## 2025-01-27 PROCEDURE — 36415 COLL VENOUS BLD VENIPUNCTURE: CPT | Performed by: STUDENT IN AN ORGANIZED HEALTH CARE EDUCATION/TRAINING PROGRAM

## 2025-01-27 PROCEDURE — 250N000013 HC RX MED GY IP 250 OP 250 PS 637: Performed by: STUDENT IN AN ORGANIZED HEALTH CARE EDUCATION/TRAINING PROGRAM

## 2025-01-27 PROCEDURE — 80053 COMPREHEN METABOLIC PANEL: CPT | Performed by: STUDENT IN AN ORGANIZED HEALTH CARE EDUCATION/TRAINING PROGRAM

## 2025-01-27 PROCEDURE — 84155 ASSAY OF PROTEIN SERUM: CPT | Performed by: STUDENT IN AN ORGANIZED HEALTH CARE EDUCATION/TRAINING PROGRAM

## 2025-01-27 PROCEDURE — 250N000013 HC RX MED GY IP 250 OP 250 PS 637: Performed by: INTERNAL MEDICINE

## 2025-01-27 RX ADMIN — SUCRALFATE ORAL 1 G: 1 SUSPENSION ORAL at 06:36

## 2025-01-27 RX ADMIN — CARVEDILOL 25 MG: 12.5 TABLET, FILM COATED ORAL at 07:40

## 2025-01-27 RX ADMIN — ACETAMINOPHEN 1000 MG: 500 TABLET, FILM COATED ORAL at 07:40

## 2025-01-27 RX ADMIN — ACETAMINOPHEN 1000 MG: 500 TABLET, FILM COATED ORAL at 15:07

## 2025-01-27 RX ADMIN — ACETAMINOPHEN 1000 MG: 500 TABLET, FILM COATED ORAL at 12:23

## 2025-01-27 RX ADMIN — FAMOTIDINE 20 MG: 20 TABLET, FILM COATED ORAL at 09:50

## 2025-01-27 ASSESSMENT — ACTIVITIES OF DAILY LIVING (ADL)
ADLS_ACUITY_SCORE: 37
ADLS_ACUITY_SCORE: 39
ADLS_ACUITY_SCORE: 37
ADLS_ACUITY_SCORE: 37
ADLS_ACUITY_SCORE: 39
ADLS_ACUITY_SCORE: 37
ADLS_ACUITY_SCORE: 39
ADLS_ACUITY_SCORE: 37
ADLS_ACUITY_SCORE: 37
ADLS_ACUITY_SCORE: 39

## 2025-01-27 NOTE — PHARMACY - DISCHARGE MEDICATION RECONCILIATION
"Pharmacy: Discharge Counseling and Medication Reconciliation    Karen Rodriguez  [address] [address]  [phone] (home)   79 year old female  PCP:Isael Parks    Allergies   Allergen Reactions    Ace Inhibitors Other (See Comments)     Angioedema    Hydrocodone-Acetaminophen Nausea and Vomiting    Azithromycin      dehydration    Omeprazole      Flares subacute lupus erythematosis    Oxycodone GI Disturbance    Pantoprazole Rash     Flares subacute lupus erythematosis    Vancomycin Itching and Other (See Comments)     Red Man Syndrome. Redness to chest, face, head and itching.    Celecoxib Rash    Clindamycin GI Disturbance    Gabapentin      Drowsy, fatigue, unsteady    Latex Rash     bandaids    Morphine And Codeine Nausea    Naproxen Other (See Comments)     abd pain    Penicillins Other (See Comments)     Tachycardia and flushed face    Pregabalin Other (See Comments)     Groggy, eye problem    Propoxyphene GI Disturbance    Risedronate      Trouble swallowing    Sulfamethoxazole-Trimethoprim Diarrhea    Tramadol Nausea     \"cold sweats, weak, vomiting\"       Discharge Counseling:    Pharmacist met with patient (and/or family) today to review the medication portion of the After Visit Summary (with an emphasis on NEW medications) and to address patient's questions/concerns.     Summary of Education:   Did not meet with patient at time of discharge, no changes made to medication regimen.     Discharge Medication Reconciliation:    Maddie Huizar [facility] has reviewed the patient's discharge medication orders and has compared them to the inpatient medication administration record and to what the patient was taking prior to admission- any discrepancies have been resolved.     It has been determined that the patient has an adequate supply of medications available or which can be obtained from the patient's preferred pharmacy, which has been confirmed as: none new to patient.     Thank " you for the consult.     Maddie uHizar, [facility] ....................  1/27/2025   1:24 PM

## 2025-01-27 NOTE — PLAN OF CARE
A&O, VSS- tolerating room air, expiratory wheezes throughout- made provider aware, denies pain this shift, denies SOB, denies nausea, chest tube dressing CDI, Allevyn to mid back- CDI. Patient reports feeling ready to discharge- waiting on daughter to arrive.     BP (!) 144/66   Pulse 82   Temp 97.7  F (36.5  C) (Tympanic)   Resp 14   Wt 48.7 kg (107 lb 4.8 oz)   SpO2 92%   BMI 21.67 kg/m      Ashanti Baires RN on 1/27/2025 at 2:13 PM

## 2025-01-27 NOTE — PROGRESS NOTES
SAFETY CHECKLIST  ID Bands and Risk clasps correct and in place (DNR, Fall risk, Allergy, Latex, Limb):  Yes  All Lines Reconciled and labeled correctly: Yes  Whiteboard updated:Yes  Environmental interventions: Yes  Verify Tele #: AASHISH Mcfadden RN on 1/27/2025 at 1:47 PM

## 2025-01-27 NOTE — PLAN OF CARE
/63 (BP Location: Right arm, Patient Position: Semi-Diana's, Cuff Size: Adult Regular)   Pulse 74   Temp 98.1  F (36.7  C) (Tympanic)   Resp 14   Wt 48.7 kg (107 lb 4.8 oz)   SpO2 94%   BMI 21.67 kg/m      Pt A&O, VSS, afebrile, denies pain at this time. On room air, LS diminished. Allevyn on back for blanchable redness. Chest tube incision dressing CDI. Compazine given for nausea. Bowel and bladder continent. Ax1 w/ walker.

## 2025-01-27 NOTE — DISCHARGE SUMMARY
[facility]  Hospitalist Discharge Summary      Date of Admission:  1/20/2025  Date of Discharge:  1/27/2025  Discharging Provider: Lefty Herrera MD  Discharge Service: Hospitalist Service    Discharge Diagnoses   Principal Problem:    Pneumothorax  Active Problems:    Chronic diastolic heart failure (H)    Pulmonary emphysema, unspecified emphysema type (H)    Rheumatoid arthritis (H)    Hypertensive heart disease with congestive heart failure (H)    Right-sided lacunar infarction (H)    HLD (hyperlipidemia)    Essential (primary) hypertension    NSCLC of right lung (H)       Clinically Significant Risk Factors          Follow-ups Needed After Discharge   Follow-up Appointments       Follow-up and recommended labs and tests       Follow-up with nan kay on 2/4/2025  @ 1045 with             Repeat chest xray         Discharge Disposition   Discharged to home  Condition at discharge: Stable    Hospital Course   Karen Rodriguez is a 79 year old woman with a past medical history of pulmonary emphysema, chronic diastolic heart failure, rheumatoid arthritis, prior stroke, hyperlipidemia, hypertension, prior tobacco use who was admitted to the hospital after a pneumothorax during her stereotactic biopsy of her lung. She unfortunately had a persistent air leak.       Pneumothorax    Pulmonary emphysema, unspecified emphysema type (H)    Assessment: Pneumothorax after stereotactic biopsy of the right lower lobe concerning for malignancy.  Airleak greatly improved and not present on my exam today.  Intermittently having chest pain which is likely related to her pneumothorax/chest tube.  Troponins have been negative multiple times. Chest tube removed on 1/26. A follow up chest xray PM of 1/16 and morning of 1/27 showed no worsening pneumothorax, and a very minimal persistent apical pneumothorax. She is not hypoxic and no dyspnea with movement. She was discharged home and with follow  up on 2/4 with a repeat chest xray.      Active Problems:    Chronic diastolic heart failure (H)    Hypertensive heart disease with congestive heart failure (H)  CKD stage 3b    Assessment: Appears euvolemic at this time, not on diuretics prior to admission.          Essential (primary) hypertension    Assessment: PTA on amlodipine 5mg daily, Carvedilol 25mg BID. Resume on discharge        Rheumatoid arthritis (H)    Assessment: Does not appear to be on any controller medications at this time.     History of Right-sided lacunar infarction (H)    HLD (hyperlipidemia)    Assessment: Prior to admission on atorvastatin 40 mg daily does not appear to be on aspirin.       Anemia  Assessment: Normocytic with a decline in her hemoglobin from 10.8 on 12- down 8.5-9. Minimal output from her chest tube.  Could be dilutional versus some blood loss.  Hemoglobin 9.4 on discharge       Consultations This Hospital Stay   SURGERY GENERAL IP CONSULT  PHYSICAL THERAPY ADULT IP CONSULT    Code Status   Full Code    Time Spent on this Encounter   I, Lefty Herrera MD, personally saw the patient today and spent greater than 30 minutes discharging this patient.       Lefty Herrera MD  [facility] [place]  [address]  [place] [address]  Phone: [phone]  Fax: [phone]  ______________________________________________________________________    Physical Exam   Vital Signs: Temp: 97.7  F (36.5  C) Temp src: Tympanic BP: (!) 144/66 Pulse: 82   Resp: 14 SpO2: 92 % O2 Device: None (Room air)    Weight: 107 lbs 4.8 oz  GENERAL: Comfortable, no apparent distress.  CARDIOVASCULAR: regular rate and rhythm, no murmur. No lower extremity edema   RESPIRATORY: Clear to auscultation bilaterally, no wheezes or crackles.  GI: non-tender, non-distended, normal bowel sounds.   SKIN: warm periphery, no rashes         Primary Care Physician   WEI TAYLOR    Discharge Orders      Reason for your  hospital stay    Pneumothorax     Follow-up and recommended labs and tests     Follow-up with nan [place] on 2/4/2025  @ 1045 with Dr.Tran Adames    Your activity upon discharge: activity as tolerated     Diet    Combination Diet Regular Diet Adult       Significant Results and Procedures   Most Recent 3 CBC's:  Recent Labs   Lab Test 01/27/25  0540 01/25/25  0058 01/24/25  0555   WBC 4.5 4.1 3.8*   HGB 9.4* 9.1* 8.9*   MCV 94 100 97    194 184     Most Recent 3 BMP's:  Recent Labs   Lab Test 01/27/25  0540 01/24/25  2253 01/24/25  0555    132* 134*   POTASSIUM 3.4 3.7 3.9   CHLORIDE 106 103 104   CO2 23 20* 24   BUN 20.9 23.5* 24.6*   CR 1.06* 0.97* 1.25*   ANIONGAP 6* 9 6*   ARMANDO 8.4* 8.1* 8.0*   GLC 76 115* 84     Most Recent 2 LFT's:  Recent Labs   Lab Test 01/27/25  0540 01/24/25  2253   AST 49* 91*   ALT 45 75*   ALKPHOS 203* 200*   BILITOTAL 0.3 0.3   ,   Results for orders placed or performed during the hospital encounter of 01/20/25   XR Chest Port 1 View    Narrative    Procedure:XR CHEST PORT 1 VIEW    Clinical history:Female, 79 years, follow up pneumothorax    Technique: Single view was obtained.    Comparison: 1/20/2025    Findings: The cardiac silhouette is within normal limits.    The lungs demonstrate a small right apical pneumothorax which is  slightly larger when compared to the study from 1/20/2025 at 1029  hours. Right-sided chest tube remains satisfactorily positioned. Bony  structures are unremarkable.      Impression    Impression:   Small right apical thorax is slightly larger when compared with prior  study.     Subcutaneous emphysema along the right hemithorax.    ACE PURCELL MD         SYSTEM ID:  [id number]   XR Chest Port 1 View    Narrative    PROCEDURE: XR CHEST PORT 1 VIEW 1/21/2025 2:26 PM    HISTORY: pneumothorax    COMPARISONS: Earlier study of the same date.    TECHNIQUE: Single portable view.    FINDINGS: Right-sided chest tube is in place. There is a  small  right-sided pneumothorax, stable in size compared to the prior exam.  Subcutaneous emphysema is again noted.    Heart size is stable. Lungs are relatively clear with only mild patchy  and linear density medially at the right lung base. There is no  pleural effusion.    There are bilateral shoulder arthroplasties. There is a probable  hiatal hernia.         Impression    IMPRESSION: Persistent right-sided pneumothorax, similar in appearance  to the earlier study.    TEZ GREENBERG MD         SYSTEM ID:  [id number]   XR Chest Port 1 View    Narrative    EXAM: XR CHEST PORT 1 VIEW  LOCATION: [facility]  DATE: 1/22/2025    INDICATION: follow up pneumothorax  COMPARISON: 8/21/2025.    FINDINGS: Right chest tube remains in place. Trace residual pneumothorax. The heart size is normal. The thoracic aorta is calcified. Mild atelectasis and scarring at the lung bases. Hiatal hernia. Bilateral shoulder arthroplasties. Subcutaneous emphysema   in the right chest wall.      Impression    IMPRESSION: Trace residual right pneumothorax.   XR Chest Port 1 View    Narrative    EXAM: XR CHEST PORT 1 VIEW  LOCATION: [facility]  DATE: 1/24/2025    INDICATION: follow up pneumothorax  COMPARISON: 1/22/2025      Impression    IMPRESSION: Stable right chest tube. Slight increase in size of a trace right apical pneumothorax. Bibasilar atelectasis. Normal heart size. Subcutaneous emphysema in the right chest wall. Bilateral shoulder arthroplasties.   XR Chest Port 1 View    Narrative    EXAM: XR CHEST PORT 1 VIEW  LOCATION: [facility]  DATE: 1/24/2025    INDICATION: Dyspnea  COMPARISON: 1/24/2025 at 0623 hour      Impression    IMPRESSION: Right chest tube is in stable position. Small right apical pneumothorax has mildly increased. No significant mediastinal shift. Extensive subcutaneous gas in the right chest wall has increased. Heart size and pulmonary vascularity are  within   normal limits. Mild streaky atelectasis in the lung bases. No confluent consolidation pleural effusion or left pneumothorax. A small hiatal hernia is again noted. Reverse right total shoulder arthroplasty and left humeral prosthesis.   XR Chest Port 1 View    Narrative    EXAM: XR CHEST PORT 1 VIEW  LOCATION: [facility]  DATE: 1/26/2025    INDICATION: follow up pneumothorax  COMPARISON: 01/24/2025.      Impression    IMPRESSION: Slight interval retraction of right chest with distal tip projecting at the right lateral upper lung. A trace right pneumothorax has decreased in size. Possible trace right pleural effusion. Improved aeration of the lungs with improved   streaky bibasilar and perihilar atelectasis. Stable cardiac silhouette. Hiatal hernia. Persistent subcutaneous emphysema of the right chest wall. Bilateral shoulder arthroplasties with reverse arthroplasty on the right.   XR Chest Port 1 View    Narrative    EXAM: XR CHEST PORTABLE 1 VIEW  LOCATION: [facility]  DATE: 01/26/2025    INDICATION: Follow-up chest tube removal.  1500  COMPARISON: Chest film 1/26/2025 performed earlier in the day.      Impression    IMPRESSION: The right chest tube has been removed since the earlier examination. The tiny right apical pneumothorax remains unchanged and measures under 2 mm in size. Subcutaneous air along the right lateral chest wall is unchanged. Bibasilar   atelectasis. The upper lungs remain clear. Surgical clips projected over the right mid chest. Normal heart size and pulmonary vascularity. Aortic calcification. Moderate-sized esophageal hiatal hernia. Bilateral shoulder arthroplasties.     XR Chest Port 1 View    Narrative    PROCEDURE: XR CHEST PORT 1 VIEW 1/27/2025 9:53 AM    HISTORY: 1 day post thoracostomy removal.    COMPARISONS: 1/26/2025.    TECHNIQUE: Single portable view.    FINDINGS: Minimal right-sided pneumothorax persists, measuring less  than 2  "mm in size. Subcutaneous gas is again seen in the right axilla.    Appearance of the chest is stable. There are bilateral shoulder  arthroplasties.         Impression    IMPRESSION: Tiny residual right-sided pneumothorax.    TEZ GREENBERG MD         SYSTEM ID:  [id number]       Discharge Medications   Current Discharge Medication List        CONTINUE these medications which have NOT CHANGED    Details   acetaminophen (TYLENOL) 325 MG tablet Take 325 mg by mouth every 4 hours as needed for pain.      amLODIPine (NORVASC) 5 MG tablet Take 1 tablet by mouth daily.      atorvastatin (LIPITOR) 40 MG tablet Take 1 Tablet by mouth with supper.      carvedilol (COREG) 25 MG tablet Take 1 tablet by mouth 2 times daily (with meals).      famotidine (PEPCID) 20 MG tablet Take 1 tablet by mouth 2 times daily.      vitamin C (ASCORBIC ACID) 500 MG tablet Take 1 tablet by mouth daily.      Vitamin D, Cholecalciferol, 10 MCG (400 UNIT) TABS Take 1 tablet by mouth daily.           Allergies   Allergies   Allergen Reactions    Ace Inhibitors Other (See Comments)     Angioedema    Hydrocodone-Acetaminophen Nausea and Vomiting    Azithromycin      dehydration    Omeprazole      Flares subacute lupus erythematosis    Oxycodone GI Disturbance    Pantoprazole Rash     Flares subacute lupus erythematosis    Vancomycin Itching and Other (See Comments)     Red Man Syndrome. Redness to chest, face, head and itching.    Celecoxib Rash    Clindamycin GI Disturbance    Gabapentin      Drowsy, fatigue, unsteady    Latex Rash     bandaids    Morphine And Codeine Nausea    Naproxen Other (See Comments)     abd pain    Penicillins Other (See Comments)     Tachycardia and flushed face    Pregabalin Other (See Comments)     Groggy, eye problem    Propoxyphene GI Disturbance    Risedronate      Trouble swallowing    Sulfamethoxazole-Trimethoprim Diarrhea    Tramadol Nausea     \"cold sweats, weak, vomiting\"     "

## 2025-01-27 NOTE — PROGRESS NOTES
Discharge Note    Data:  Karen Rodriguez discharged to home at 1530 via wheel chair. Accompanied by daughter and staff.    Action:  Written discharge/follow-up instructions were provided to patient and daughter.    Bonita Mcfadden RN on 1/27/2025 at 3:34 PM

## 2025-01-27 NOTE — PLAN OF CARE
Goal Outcome Evaluation:    VSS, afebrile, patient complains of pain at chest tube insertion site, but has improved since chest tube removal. Patient declined use of PRN medications for pain and only wanted scheduled acetaminophen.     Chest tube removed today by MD. Dressing over chest tube site is CDI. Patient denies any shortness of breath. Fine crackles heard in R lower lobe.     Patient complains of nausea PRN medications given and effective. Patient had a medium BM today, bowel sounds faint, but audible. Patient stated that she is passing gas. Blanchable redness to lower spine Allevyn in place for protection.     Patient refused to get up into recliner for all meals. Patient refused to ambulate in the hallway during shift. Patient has been ambulating to the bathroom with assistance from 1 staff member with gait belt and walker.         Plan of Care Reviewed With: patient    Overall Patient Progress: improvingOverall Patient Progress: improving    Outcome Evaluation: VSS, afebrile, patient complains of pain at chest tube insertion site, but has imporved since chest tube removal.    Shaylee Henson RN 1/26/2025 7:07 PM

## 2025-01-28 ENCOUNTER — PATIENT OUTREACH (OUTPATIENT)
Dept: FAMILY MEDICINE | Facility: OTHER | Age: 80
End: 2025-01-28
Payer: COMMERCIAL

## 2025-01-28 NOTE — TELEPHONE ENCOUNTER
Patient has PCP elsewhere, no follow-up here. No TCM call required per policy.  Malini Shah RN on 1/28/2025 at 7:49 AM

## 2025-01-29 ENCOUNTER — TELEPHONE (OUTPATIENT)
Dept: RADIATION ONCOLOGY | Facility: HOSPITAL | Age: 80
End: 2025-01-29

## 2025-01-29 NOTE — TELEPHONE ENCOUNTER
Spoke to patient and informed her that Dr. Reyes would like her to come back for a follow-up and simulation.  We have openings the week of 2/3/2025.  She will talk with her son and daughter because she is not able to drive.  If they cannot bring her next week she is interested in receiving transportation through her insurance.  She will call back tomorrow with an update.  Patient given contact information.  She has no further questions.  She states she is feeling well since discharge from hospital.     Ada Stokes RN

## 2025-01-30 ENCOUNTER — TELEPHONE (OUTPATIENT)
Dept: RADIATION ONCOLOGY | Facility: HOSPITAL | Age: 80
End: 2025-01-30
Payer: COMMERCIAL

## 2025-01-30 NOTE — TELEPHONE ENCOUNTER
Patient calls to report that she does not have any family that can bring her next week for follow up/simulation.  This writer reached out to [employer], and the patient does not qualify for rides through her insurance.  Gabriela is going to see if a volunteer  program will able to assist with rides.  Called patient and updated her.  Explained that she will receive a call back when we have more information regarding transportation.  She verbalized understanding.      Ada Stokes RN

## 2025-02-11 ENCOUNTER — TELEPHONE (OUTPATIENT)
Dept: CARE COORDINATION | Facility: OTHER | Age: 80
End: 2025-02-11

## 2025-02-11 NOTE — CONFIDENTIAL NOTE
Care Coordination Focused Note:    Called and spoke with patient to inquire if she's received paperwork from [facility] re: transportation. States she has not yet received it. Attempted to call [facility], but the line was busy x2. Will continue trying to reach Ashanti at [facility] to see if an enrollment packet has been sent to patient. If not, will request for it to be sent. Will keep patient updated.

## 2025-02-24 ENCOUNTER — TELEPHONE (OUTPATIENT)
Dept: CARE COORDINATION | Facility: OTHER | Age: 80
End: 2025-02-24

## 2025-02-24 NOTE — CONFIDENTIAL NOTE
Care Coordination Focused Note:    Called and spoke w/pt. States she has not received the transportation paperwork from [employer]. Shares she has been sick, so she hasn't checked her mail. Asked that pt call writer when she receives the paperwork. Pt notes that if writer does not hear from pt, it means she hasn't received the paperwork yet. Will relay this message to the Radiation Therapy team.

## 2025-02-26 ENCOUNTER — TELEPHONE (OUTPATIENT)
Dept: RADIATION ONCOLOGY | Facility: HOSPITAL | Age: 80
End: 2025-02-26

## 2025-02-26 DIAGNOSIS — C34.82: ICD-10-CM

## 2025-02-26 DIAGNOSIS — C34.91 NSCLC OF RIGHT LUNG (H): Primary | ICD-10-CM

## 2025-02-26 NOTE — TELEPHONE ENCOUNTER
Spoke with Karen and informed her the Dr. Reyes is placing a PET scan order for treatment planning. In addition, we discussed rides to recovery which she had not heard about at this time. Will have Gabriela BLANCHARD reach out to patient to discuss program so that we can coordinate rides to her radiation appointments. Will also attempt to coordinate PET and simulation on same day to save her the trip and ride arrangement. Patient was very please with plan and appreciated call.

## 2025-02-27 ENCOUNTER — TELEPHONE (OUTPATIENT)
Dept: CARE COORDINATION | Facility: OTHER | Age: 80
End: 2025-02-27

## 2025-02-27 NOTE — CONFIDENTIAL NOTE
Attempted to call pt to discuss the [employer] volunteer  transportation program. Pt did not answer and there was no option to leave a vm. Once radiation therapy simulation appt is scheduled (care team attempting to schedule this on the same day as patient's PET scan), writer will contact [employer] w/the appt information and will notify patient.

## 2025-03-03 ENCOUNTER — TELEPHONE (OUTPATIENT)
Dept: CARE COORDINATION | Facility: OTHER | Age: 80
End: 2025-03-03

## 2025-03-03 ENCOUNTER — DOCUMENTATION ONLY (OUTPATIENT)
Dept: RADIATION ONCOLOGY | Facility: HOSPITAL | Age: 80
End: 2025-03-03

## 2025-03-03 ENCOUNTER — TELEPHONE (OUTPATIENT)
Dept: RADIATION ONCOLOGY | Facility: HOSPITAL | Age: 80
End: 2025-03-03

## 2025-03-03 NOTE — PROGRESS NOTES
Gabriela BLANCHARD notified that staff is unable to get in touch with Karen to schedule simulation.

## 2025-03-03 NOTE — CONFIDENTIAL NOTE
A.M: Writer called [employer] ([employer]) and spoke with Urban. States a volunteer has not yet claimed patient's ride for tomorrow. Was advised writer would be notified by 1:00pm today if there is a volunteer; if not, the ride request will be canceled. Writer will then attempt to find another form of transportation for patient's (tentative) radiation therapy simulation appt and PET scan 3/4/25.    P.M: Received automated message from [employer] to [facility] stating the trip request was canceled d/t no volunteer drivers being available.

## 2025-03-03 NOTE — TELEPHONE ENCOUNTER
Attempted to reach Karen to schedule her simulation. Voicemail is not set up so unable to leave message.

## 2025-03-03 NOTE — TELEPHONE ENCOUNTER
Attempted to reach patient via telephone to schedule simulation on 03/04/2025. No answer, no voicemail set up.

## 2025-03-05 ENCOUNTER — TELEPHONE (OUTPATIENT)
Dept: RADIATION ONCOLOGY | Facility: HOSPITAL | Age: 80
End: 2025-03-05

## 2025-03-05 NOTE — TELEPHONE ENCOUNTER
Attempted to reach Karen as she did not show up for her PET scan yesterday and has not returned any calls to schedule her radiation simulation appointment. Sent letter on 03/05/2025.

## (undated) RX ORDER — MIDODRINE HYDROCHLORIDE 10 MG/1
TABLET ORAL
Status: DISPENSED
Start: 2025-01-24